# Patient Record
Sex: FEMALE | Race: WHITE | NOT HISPANIC OR LATINO | Employment: FULL TIME | URBAN - METROPOLITAN AREA
[De-identification: names, ages, dates, MRNs, and addresses within clinical notes are randomized per-mention and may not be internally consistent; named-entity substitution may affect disease eponyms.]

---

## 2024-01-08 ENCOUNTER — LAB (OUTPATIENT)
Dept: LAB | Facility: HOSPITAL | Age: 39
End: 2024-01-08
Payer: COMMERCIAL

## 2024-01-08 ENCOUNTER — TRANSCRIBE ORDERS (OUTPATIENT)
Dept: LAB | Facility: HOSPITAL | Age: 39
End: 2024-01-08
Payer: COMMERCIAL

## 2024-01-08 DIAGNOSIS — Z3A.24 24 WEEKS GESTATION OF PREGNANCY: ICD-10-CM

## 2024-01-08 DIAGNOSIS — Z3A.24 24 WEEKS GESTATION OF PREGNANCY: Primary | ICD-10-CM

## 2024-01-08 LAB
BLD GP AB SCN SERPL QL: NEGATIVE
DEPRECATED RDW RBC AUTO: 42.8 FL (ref 37–54)
ERYTHROCYTE [DISTWIDTH] IN BLOOD BY AUTOMATED COUNT: 12.9 % (ref 12.3–15.4)
GLUCOSE 1H P 100 G GLC PO SERPL-MCNC: 111 MG/DL (ref 65–139)
HCT VFR BLD AUTO: 35.5 % (ref 34–46.6)
HGB BLD-MCNC: 12.1 G/DL (ref 12–15.9)
MCH RBC QN AUTO: 31.5 PG (ref 26.6–33)
MCHC RBC AUTO-ENTMCNC: 34.1 G/DL (ref 31.5–35.7)
MCV RBC AUTO: 92.4 FL (ref 79–97)
PLATELET # BLD AUTO: 262 10*3/MM3 (ref 140–450)
PMV BLD AUTO: 9.7 FL (ref 6–12)
RBC # BLD AUTO: 3.84 10*6/MM3 (ref 3.77–5.28)
WBC NRBC COR # BLD AUTO: 10.09 10*3/MM3 (ref 3.4–10.8)

## 2024-01-08 PROCEDURE — 36415 COLL VENOUS BLD VENIPUNCTURE: CPT

## 2024-01-08 PROCEDURE — 82948 REAGENT STRIP/BLOOD GLUCOSE: CPT | Performed by: OBSTETRICS & GYNECOLOGY

## 2024-01-08 PROCEDURE — 86850 RBC ANTIBODY SCREEN: CPT

## 2024-01-08 PROCEDURE — 82950 GLUCOSE TEST: CPT

## 2024-01-08 PROCEDURE — 85027 COMPLETE CBC AUTOMATED: CPT | Performed by: OBSTETRICS & GYNECOLOGY

## 2024-02-09 ENCOUNTER — TRANSCRIBE ORDERS (OUTPATIENT)
Dept: LAB | Facility: HOSPITAL | Age: 39
End: 2024-02-09
Payer: COMMERCIAL

## 2024-02-09 ENCOUNTER — LAB (OUTPATIENT)
Dept: LAB | Facility: HOSPITAL | Age: 39
End: 2024-02-09
Payer: COMMERCIAL

## 2024-02-09 DIAGNOSIS — Z3A.29 29 WEEKS GESTATION OF PREGNANCY: Primary | ICD-10-CM

## 2024-02-09 DIAGNOSIS — Z3A.29 29 WEEKS GESTATION OF PREGNANCY: ICD-10-CM

## 2024-02-09 PROCEDURE — 36415 COLL VENOUS BLD VENIPUNCTURE: CPT

## 2024-02-09 PROCEDURE — 86780 TREPONEMA PALLIDUM: CPT

## 2024-02-12 LAB — TREPONEMA PALLIDUM IGG+IGM AB [PRESENCE] IN SERUM OR PLASMA BY IMMUNOASSAY: NON REACTIVE

## 2024-03-17 ENCOUNTER — HOSPITAL ENCOUNTER (INPATIENT)
Facility: HOSPITAL | Age: 39
LOS: 4 days | Discharge: HOME | End: 2024-03-21
Attending: OBSTETRICS & GYNECOLOGY | Admitting: OBSTETRICS & GYNECOLOGY
Payer: COMMERCIAL

## 2024-03-17 ENCOUNTER — ANESTHESIA EVENT (OUTPATIENT)
Dept: LABOR AND DELIVERY | Facility: HOSPITAL | Age: 39
End: 2024-03-17
Payer: COMMERCIAL

## 2024-03-17 ENCOUNTER — ANESTHESIA (OUTPATIENT)
Dept: LABOR AND DELIVERY | Facility: HOSPITAL | Age: 39
End: 2024-03-17
Payer: COMMERCIAL

## 2024-03-17 DIAGNOSIS — Z98.891 STATUS POST REPEAT LOW TRANSVERSE CESAREAN SECTION: Primary | ICD-10-CM

## 2024-03-17 LAB
ABO GROUP BLD: NORMAL
ABO GROUP BLD: NORMAL
BLD GP AB SCN SERPL QL: NEGATIVE
DEPRECATED RDW RBC AUTO: 41.1 FL (ref 37–54)
ERYTHROCYTE [DISTWIDTH] IN BLOOD BY AUTOMATED COUNT: 13.4 % (ref 12.3–15.4)
HCT VFR BLD AUTO: 35.1 % (ref 34–46.6)
HGB BLD-MCNC: 11.6 G/DL (ref 12–15.9)
MCH RBC QN AUTO: 28.4 PG (ref 26.6–33)
MCHC RBC AUTO-ENTMCNC: 33 G/DL (ref 31.5–35.7)
MCV RBC AUTO: 86 FL (ref 79–97)
PLATELET # BLD AUTO: 237 10*3/MM3 (ref 140–450)
PMV BLD AUTO: 9.7 FL (ref 6–12)
POC AMNISURE: POSITIVE
RBC # BLD AUTO: 4.08 10*6/MM3 (ref 3.77–5.28)
RH BLD: POSITIVE
RH BLD: POSITIVE
T PALLIDUM IGG SER QL: NORMAL
T&S EXPIRATION DATE: NORMAL
WBC NRBC COR # BLD AUTO: 10.08 10*3/MM3 (ref 3.4–10.8)

## 2024-03-17 PROCEDURE — 59025 FETAL NON-STRESS TEST: CPT

## 2024-03-17 PROCEDURE — 25010000002 KETOROLAC TROMETHAMINE PER 15 MG: Performed by: OBSTETRICS & GYNECOLOGY

## 2024-03-17 PROCEDURE — 86850 RBC ANTIBODY SCREEN: CPT | Performed by: OBSTETRICS & GYNECOLOGY

## 2024-03-17 PROCEDURE — 25010000002 OXYTOCIN PER 10 UNITS: Performed by: ANESTHESIOLOGY

## 2024-03-17 PROCEDURE — 86780 TREPONEMA PALLIDUM: CPT | Performed by: OBSTETRICS & GYNECOLOGY

## 2024-03-17 PROCEDURE — 86900 BLOOD TYPING SEROLOGIC ABO: CPT

## 2024-03-17 PROCEDURE — 86900 BLOOD TYPING SEROLOGIC ABO: CPT | Performed by: OBSTETRICS & GYNECOLOGY

## 2024-03-17 PROCEDURE — 84112 EVAL AMNIOTIC FLUID PROTEIN: CPT | Performed by: OBSTETRICS & GYNECOLOGY

## 2024-03-17 PROCEDURE — 59025 FETAL NON-STRESS TEST: CPT | Performed by: OBSTETRICS & GYNECOLOGY

## 2024-03-17 PROCEDURE — 25810000003 LACTATED RINGERS SOLUTION: Performed by: OBSTETRICS & GYNECOLOGY

## 2024-03-17 PROCEDURE — 25010000002 ONDANSETRON PER 1 MG: Performed by: ANESTHESIOLOGY

## 2024-03-17 PROCEDURE — 25010000002 CEFAZOLIN PER 500 MG: Performed by: OBSTETRICS & GYNECOLOGY

## 2024-03-17 PROCEDURE — 85027 COMPLETE CBC AUTOMATED: CPT | Performed by: OBSTETRICS & GYNECOLOGY

## 2024-03-17 PROCEDURE — 86901 BLOOD TYPING SEROLOGIC RH(D): CPT | Performed by: OBSTETRICS & GYNECOLOGY

## 2024-03-17 PROCEDURE — 25810000003 LACTATED RINGERS PER 1000 ML: Performed by: OBSTETRICS & GYNECOLOGY

## 2024-03-17 PROCEDURE — 88307 TISSUE EXAM BY PATHOLOGIST: CPT | Performed by: OBSTETRICS & GYNECOLOGY

## 2024-03-17 PROCEDURE — 88302 TISSUE EXAM BY PATHOLOGIST: CPT | Performed by: OBSTETRICS & GYNECOLOGY

## 2024-03-17 PROCEDURE — 25010000002 METOCLOPRAMIDE PER 10 MG: Performed by: ANESTHESIOLOGY

## 2024-03-17 PROCEDURE — S0260 H&P FOR SURGERY: HCPCS | Performed by: OBSTETRICS & GYNECOLOGY

## 2024-03-17 PROCEDURE — 25010000002 MIDAZOLAM PER 1 MG: Performed by: ANESTHESIOLOGY

## 2024-03-17 PROCEDURE — 25010000002 FENTANYL CITRATE (PF) 100 MCG/2ML SOLUTION: Performed by: ANESTHESIOLOGY

## 2024-03-17 PROCEDURE — 25010000002 BUPIVACAINE IN DEXTROSE 0.75-8.25 % SOLUTION: Performed by: ANESTHESIOLOGY

## 2024-03-17 PROCEDURE — 25010000002 MORPHINE PER 10 MG: Performed by: ANESTHESIOLOGY

## 2024-03-17 PROCEDURE — 86901 BLOOD TYPING SEROLOGIC RH(D): CPT

## 2024-03-17 PROCEDURE — 0UB70ZZ EXCISION OF BILATERAL FALLOPIAN TUBES, OPEN APPROACH: ICD-10-PCS | Performed by: OBSTETRICS & GYNECOLOGY

## 2024-03-17 DEVICE — SEAL HEMO SURG ARISTA/AH ABS/PWDR 3GM: Type: IMPLANTABLE DEVICE | Site: ABDOMEN | Status: FUNCTIONAL

## 2024-03-17 RX ORDER — KETOROLAC TROMETHAMINE 30 MG/ML
30 INJECTION, SOLUTION INTRAMUSCULAR; INTRAVENOUS ONCE
Status: COMPLETED | OUTPATIENT
Start: 2024-03-17 | End: 2024-03-17

## 2024-03-17 RX ORDER — DOCUSATE SODIUM 100 MG/1
100 CAPSULE, LIQUID FILLED ORAL 2 TIMES DAILY PRN
Status: DISCONTINUED | OUTPATIENT
Start: 2024-03-17 | End: 2024-03-21 | Stop reason: HOSPADM

## 2024-03-17 RX ORDER — CITRIC ACID/SODIUM CITRATE 334-500MG
30 SOLUTION, ORAL ORAL ONCE
Status: COMPLETED | OUTPATIENT
Start: 2024-03-17 | End: 2024-03-17

## 2024-03-17 RX ORDER — MORPHINE SULFATE 0.5 MG/ML
INJECTION, SOLUTION EPIDURAL; INTRATHECAL; INTRAVENOUS AS NEEDED
Status: DISCONTINUED | OUTPATIENT
Start: 2024-03-17 | End: 2024-03-17 | Stop reason: SURG

## 2024-03-17 RX ORDER — METHYLERGONOVINE MALEATE 0.2 MG/ML
200 INJECTION INTRAVENOUS ONCE AS NEEDED
Status: DISCONTINUED | OUTPATIENT
Start: 2024-03-17 | End: 2024-03-17 | Stop reason: HOSPADM

## 2024-03-17 RX ORDER — KETOROLAC TROMETHAMINE 15 MG/ML
15 INJECTION, SOLUTION INTRAMUSCULAR; INTRAVENOUS EVERY 6 HOURS
Status: COMPLETED | OUTPATIENT
Start: 2024-03-17 | End: 2024-03-18

## 2024-03-17 RX ORDER — SODIUM CHLORIDE 0.9 % (FLUSH) 0.9 %
10 SYRINGE (ML) INJECTION EVERY 12 HOURS SCHEDULED
Status: DISCONTINUED | OUTPATIENT
Start: 2024-03-17 | End: 2024-03-17 | Stop reason: HOSPADM

## 2024-03-17 RX ORDER — ALUMINA, MAGNESIA, AND SIMETHICONE 2400; 2400; 240 MG/30ML; MG/30ML; MG/30ML
15 SUSPENSION ORAL EVERY 4 HOURS PRN
Status: DISCONTINUED | OUTPATIENT
Start: 2024-03-17 | End: 2024-03-21 | Stop reason: HOSPADM

## 2024-03-17 RX ORDER — MISOPROSTOL 200 UG/1
600 TABLET ORAL AS NEEDED
Status: DISCONTINUED | OUTPATIENT
Start: 2024-03-17 | End: 2024-03-21 | Stop reason: HOSPADM

## 2024-03-17 RX ORDER — POLYETHYLENE GLYCOL 3350 17 G/17G
17 POWDER, FOR SOLUTION ORAL DAILY
Status: DISCONTINUED | OUTPATIENT
Start: 2024-03-17 | End: 2024-03-21 | Stop reason: HOSPADM

## 2024-03-17 RX ORDER — HYDROCORTISONE 25 MG/G
1 CREAM TOPICAL AS NEEDED
Status: DISCONTINUED | OUTPATIENT
Start: 2024-03-17 | End: 2024-03-21 | Stop reason: HOSPADM

## 2024-03-17 RX ORDER — OXYCODONE HYDROCHLORIDE 5 MG/1
5 TABLET ORAL EVERY 4 HOURS PRN
Status: DISCONTINUED | OUTPATIENT
Start: 2024-03-17 | End: 2024-03-21 | Stop reason: HOSPADM

## 2024-03-17 RX ORDER — DIPHENHYDRAMINE HCL 25 MG
25 CAPSULE ORAL EVERY 4 HOURS PRN
Status: DISCONTINUED | OUTPATIENT
Start: 2024-03-17 | End: 2024-03-17 | Stop reason: SDUPTHER

## 2024-03-17 RX ORDER — PANTOPRAZOLE SODIUM 40 MG/1
40 TABLET, DELAYED RELEASE ORAL
Status: DISCONTINUED | OUTPATIENT
Start: 2024-03-17 | End: 2024-03-21 | Stop reason: HOSPADM

## 2024-03-17 RX ORDER — OXYTOCIN/0.9 % SODIUM CHLORIDE 30/500 ML
125 PLASTIC BAG, INJECTION (ML) INTRAVENOUS ONCE AS NEEDED
Status: DISCONTINUED | OUTPATIENT
Start: 2024-03-17 | End: 2024-03-21 | Stop reason: HOSPADM

## 2024-03-17 RX ORDER — DIPHENHYDRAMINE HYDROCHLORIDE 50 MG/ML
25 INJECTION INTRAMUSCULAR; INTRAVENOUS EVERY 4 HOURS PRN
Status: DISCONTINUED | OUTPATIENT
Start: 2024-03-17 | End: 2024-03-17 | Stop reason: SDUPTHER

## 2024-03-17 RX ORDER — MIDAZOLAM HYDROCHLORIDE 1 MG/ML
INJECTION INTRAMUSCULAR; INTRAVENOUS AS NEEDED
Status: DISCONTINUED | OUTPATIENT
Start: 2024-03-17 | End: 2024-03-17 | Stop reason: SURG

## 2024-03-17 RX ORDER — NALOXONE HCL 0.4 MG/ML
0.4 VIAL (ML) INJECTION
Status: ACTIVE | OUTPATIENT
Start: 2024-03-17 | End: 2024-03-18

## 2024-03-17 RX ORDER — SODIUM CHLORIDE 0.9 % (FLUSH) 0.9 %
10 SYRINGE (ML) INJECTION AS NEEDED
Status: DISCONTINUED | OUTPATIENT
Start: 2024-03-17 | End: 2024-03-17 | Stop reason: HOSPADM

## 2024-03-17 RX ORDER — OXYTOCIN/0.9 % SODIUM CHLORIDE 30/500 ML
PLASTIC BAG, INJECTION (ML) INTRAVENOUS AS NEEDED
Status: DISCONTINUED | OUTPATIENT
Start: 2024-03-17 | End: 2024-03-17 | Stop reason: SURG

## 2024-03-17 RX ORDER — ONDANSETRON 2 MG/ML
4 INJECTION INTRAMUSCULAR; INTRAVENOUS EVERY 6 HOURS PRN
Status: DISCONTINUED | OUTPATIENT
Start: 2024-03-17 | End: 2024-03-21 | Stop reason: HOSPADM

## 2024-03-17 RX ORDER — BUPIVACAINE HYDROCHLORIDE 7.5 MG/ML
INJECTION, SOLUTION INTRASPINAL
Status: COMPLETED | OUTPATIENT
Start: 2024-03-17 | End: 2024-03-17

## 2024-03-17 RX ORDER — PRENATAL WITH FERROUS FUM AND FOLIC ACID 3080; 920; 120; 400; 22; 1.84; 3; 20; 10; 1; 12; 200; 27; 25; 2 [IU]/1; [IU]/1; MG/1; [IU]/1; MG/1; MG/1; MG/1; MG/1; MG/1; MG/1; UG/1; MG/1; MG/1; MG/1; MG/1
TABLET ORAL DAILY
COMMUNITY

## 2024-03-17 RX ORDER — CALCIUM CARBONATE 500 MG/1
1 TABLET, CHEWABLE ORAL EVERY 4 HOURS PRN
Status: DISCONTINUED | OUTPATIENT
Start: 2024-03-17 | End: 2024-03-21 | Stop reason: HOSPADM

## 2024-03-17 RX ORDER — FAMOTIDINE 10 MG/ML
INJECTION, SOLUTION INTRAVENOUS AS NEEDED
Status: DISCONTINUED | OUTPATIENT
Start: 2024-03-17 | End: 2024-03-17 | Stop reason: SURG

## 2024-03-17 RX ORDER — METOCLOPRAMIDE HYDROCHLORIDE 5 MG/ML
INJECTION INTRAMUSCULAR; INTRAVENOUS AS NEEDED
Status: DISCONTINUED | OUTPATIENT
Start: 2024-03-17 | End: 2024-03-17 | Stop reason: SURG

## 2024-03-17 RX ORDER — DIPHENHYDRAMINE HCL 25 MG
25 CAPSULE ORAL EVERY 4 HOURS PRN
Status: DISCONTINUED | OUTPATIENT
Start: 2024-03-17 | End: 2024-03-21 | Stop reason: HOSPADM

## 2024-03-17 RX ORDER — METHYLERGONOVINE MALEATE 0.2 MG/ML
200 INJECTION INTRAVENOUS AS NEEDED
Status: DISCONTINUED | OUTPATIENT
Start: 2024-03-17 | End: 2024-03-21 | Stop reason: HOSPADM

## 2024-03-17 RX ORDER — OXYTOCIN/0.9 % SODIUM CHLORIDE 30/500 ML
250 PLASTIC BAG, INJECTION (ML) INTRAVENOUS CONTINUOUS
Status: ACTIVE | OUTPATIENT
Start: 2024-03-17 | End: 2024-03-17

## 2024-03-17 RX ORDER — DIPHENHYDRAMINE HYDROCHLORIDE 50 MG/ML
25 INJECTION INTRAMUSCULAR; INTRAVENOUS ONCE AS NEEDED
Status: DISCONTINUED | OUTPATIENT
Start: 2024-03-17 | End: 2024-03-21 | Stop reason: HOSPADM

## 2024-03-17 RX ORDER — ONDANSETRON 2 MG/ML
4 INJECTION INTRAMUSCULAR; INTRAVENOUS ONCE AS NEEDED
Status: ACTIVE | OUTPATIENT
Start: 2024-03-17 | End: 2024-03-18

## 2024-03-17 RX ORDER — FENTANYL CITRATE 50 UG/ML
INJECTION, SOLUTION INTRAMUSCULAR; INTRAVENOUS AS NEEDED
Status: DISCONTINUED | OUTPATIENT
Start: 2024-03-17 | End: 2024-03-17 | Stop reason: SURG

## 2024-03-17 RX ORDER — ACETAMINOPHEN 500 MG
1000 TABLET ORAL EVERY 6 HOURS
Status: COMPLETED | OUTPATIENT
Start: 2024-03-17 | End: 2024-03-18

## 2024-03-17 RX ORDER — OXYCODONE HYDROCHLORIDE 10 MG/1
10 TABLET ORAL EVERY 4 HOURS PRN
Status: DISCONTINUED | OUTPATIENT
Start: 2024-03-17 | End: 2024-03-21 | Stop reason: HOSPADM

## 2024-03-17 RX ORDER — ACETAMINOPHEN 325 MG/1
650 TABLET ORAL EVERY 6 HOURS
Status: DISCONTINUED | OUTPATIENT
Start: 2024-03-18 | End: 2024-03-20

## 2024-03-17 RX ORDER — SODIUM CHLORIDE, SODIUM LACTATE, POTASSIUM CHLORIDE, CALCIUM CHLORIDE 600; 310; 30; 20 MG/100ML; MG/100ML; MG/100ML; MG/100ML
125 INJECTION, SOLUTION INTRAVENOUS CONTINUOUS
Status: DISCONTINUED | OUTPATIENT
Start: 2024-03-17 | End: 2024-03-17

## 2024-03-17 RX ORDER — SODIUM CHLORIDE 9 MG/ML
40 INJECTION, SOLUTION INTRAVENOUS AS NEEDED
Status: DISCONTINUED | OUTPATIENT
Start: 2024-03-17 | End: 2024-03-17 | Stop reason: HOSPADM

## 2024-03-17 RX ORDER — CARBOPROST TROMETHAMINE 250 UG/ML
250 INJECTION, SOLUTION INTRAMUSCULAR AS NEEDED
Status: DISCONTINUED | OUTPATIENT
Start: 2024-03-17 | End: 2024-03-17 | Stop reason: HOSPADM

## 2024-03-17 RX ORDER — ONDANSETRON 2 MG/ML
INJECTION INTRAMUSCULAR; INTRAVENOUS AS NEEDED
Status: DISCONTINUED | OUTPATIENT
Start: 2024-03-17 | End: 2024-03-17 | Stop reason: SURG

## 2024-03-17 RX ORDER — ACETAMINOPHEN 500 MG
1000 TABLET ORAL ONCE
Status: COMPLETED | OUTPATIENT
Start: 2024-03-17 | End: 2024-03-17

## 2024-03-17 RX ORDER — PRENATAL VIT/IRON FUM/FOLIC AC 27MG-0.8MG
1 TABLET ORAL DAILY
Status: DISCONTINUED | OUTPATIENT
Start: 2024-03-17 | End: 2024-03-21 | Stop reason: HOSPADM

## 2024-03-17 RX ORDER — OXYTOCIN/0.9 % SODIUM CHLORIDE 30/500 ML
999 PLASTIC BAG, INJECTION (ML) INTRAVENOUS ONCE
Status: DISCONTINUED | OUTPATIENT
Start: 2024-03-17 | End: 2024-03-17 | Stop reason: HOSPADM

## 2024-03-17 RX ORDER — ONDANSETRON 4 MG/1
4 TABLET, ORALLY DISINTEGRATING ORAL EVERY 6 HOURS PRN
Status: DISCONTINUED | OUTPATIENT
Start: 2024-03-17 | End: 2024-03-21 | Stop reason: HOSPADM

## 2024-03-17 RX ORDER — CARBOPROST TROMETHAMINE 250 UG/ML
250 INJECTION, SOLUTION INTRAMUSCULAR AS NEEDED
Status: DISCONTINUED | OUTPATIENT
Start: 2024-03-17 | End: 2024-03-21 | Stop reason: HOSPADM

## 2024-03-17 RX ORDER — OXYTOCIN 10 [USP'U]/ML
INJECTION, SOLUTION INTRAMUSCULAR; INTRAVENOUS AS NEEDED
Status: DISCONTINUED | OUTPATIENT
Start: 2024-03-17 | End: 2024-03-17 | Stop reason: SURG

## 2024-03-17 RX ORDER — MISOPROSTOL 200 UG/1
800 TABLET ORAL AS NEEDED
Status: DISCONTINUED | OUTPATIENT
Start: 2024-03-17 | End: 2024-03-17 | Stop reason: HOSPADM

## 2024-03-17 RX ORDER — LIDOCAINE HYDROCHLORIDE 10 MG/ML
0.5 INJECTION, SOLUTION EPIDURAL; INFILTRATION; INTRACAUDAL; PERINEURAL ONCE AS NEEDED
Status: DISCONTINUED | OUTPATIENT
Start: 2024-03-17 | End: 2024-03-17 | Stop reason: HOSPADM

## 2024-03-17 RX ORDER — IBUPROFEN 600 MG/1
600 TABLET ORAL EVERY 6 HOURS
Status: DISCONTINUED | OUTPATIENT
Start: 2024-03-18 | End: 2024-03-21 | Stop reason: HOSPADM

## 2024-03-17 RX ORDER — OMEPRAZOLE 20 MG/1
20 CAPSULE, DELAYED RELEASE ORAL DAILY
COMMUNITY

## 2024-03-17 RX ORDER — ALUMINA, MAGNESIA, AND SIMETHICONE 2400; 2400; 240 MG/30ML; MG/30ML; MG/30ML
15 SUSPENSION ORAL EVERY 4 HOURS PRN
Status: DISCONTINUED | OUTPATIENT
Start: 2024-03-17 | End: 2024-03-17 | Stop reason: SDUPTHER

## 2024-03-17 RX ADMIN — Medication 1000 ML: at 05:39

## 2024-03-17 RX ADMIN — SODIUM CITRATE AND CITRIC ACID MONOHYDRATE 30 ML: 500; 334 SOLUTION ORAL at 04:48

## 2024-03-17 RX ADMIN — SODIUM CHLORIDE, POTASSIUM CHLORIDE, SODIUM LACTATE AND CALCIUM CHLORIDE 1000 ML: 600; 310; 30; 20 INJECTION, SOLUTION INTRAVENOUS at 04:20

## 2024-03-17 RX ADMIN — SODIUM CHLORIDE, POTASSIUM CHLORIDE, SODIUM LACTATE AND CALCIUM CHLORIDE: 600; 310; 30; 20 INJECTION, SOLUTION INTRAVENOUS at 06:00

## 2024-03-17 RX ADMIN — KETOROLAC TROMETHAMINE 15 MG: 15 INJECTION, SOLUTION INTRAMUSCULAR; INTRAVENOUS at 13:31

## 2024-03-17 RX ADMIN — KETOROLAC TROMETHAMINE 15 MG: 15 INJECTION, SOLUTION INTRAMUSCULAR; INTRAVENOUS at 18:33

## 2024-03-17 RX ADMIN — MIDAZOLAM HYDROCHLORIDE 1 MG: 1 INJECTION, SOLUTION INTRAMUSCULAR; INTRAVENOUS at 05:43

## 2024-03-17 RX ADMIN — MIDAZOLAM HYDROCHLORIDE 1 MG: 1 INJECTION, SOLUTION INTRAMUSCULAR; INTRAVENOUS at 05:52

## 2024-03-17 RX ADMIN — MORPHINE SULFATE 0.15 MG: 0.5 INJECTION, SOLUTION EPIDURAL; INTRATHECAL; INTRAVENOUS at 05:21

## 2024-03-17 RX ADMIN — OXYTOCIN 3 UNITS: 10 INJECTION INTRAVENOUS at 05:39

## 2024-03-17 RX ADMIN — MIDAZOLAM HYDROCHLORIDE 1 MG: 1 INJECTION, SOLUTION INTRAMUSCULAR; INTRAVENOUS at 05:14

## 2024-03-17 RX ADMIN — MIDAZOLAM HYDROCHLORIDE 2 MG: 1 INJECTION, SOLUTION INTRAMUSCULAR; INTRAVENOUS at 05:39

## 2024-03-17 RX ADMIN — SODIUM CHLORIDE 2000 MG: 900 INJECTION INTRAVENOUS at 04:48

## 2024-03-17 RX ADMIN — ACETAMINOPHEN 1000 MG: 500 TABLET ORAL at 04:32

## 2024-03-17 RX ADMIN — SODIUM CHLORIDE, POTASSIUM CHLORIDE, SODIUM LACTATE AND CALCIUM CHLORIDE: 600; 310; 30; 20 INJECTION, SOLUTION INTRAVENOUS at 06:08

## 2024-03-17 RX ADMIN — OXYCODONE 5 MG: 5 TABLET ORAL at 07:55

## 2024-03-17 RX ADMIN — ONDANSETRON 4 MG: 2 INJECTION INTRAMUSCULAR; INTRAVENOUS at 05:14

## 2024-03-17 RX ADMIN — KETOROLAC TROMETHAMINE 30 MG: 30 INJECTION, SOLUTION INTRAMUSCULAR; INTRAVENOUS at 06:41

## 2024-03-17 RX ADMIN — METOCLOPRAMIDE 10 MG: 5 INJECTION, SOLUTION INTRAMUSCULAR; INTRAVENOUS at 05:14

## 2024-03-17 RX ADMIN — SODIUM CHLORIDE, POTASSIUM CHLORIDE, SODIUM LACTATE AND CALCIUM CHLORIDE: 600; 310; 30; 20 INJECTION, SOLUTION INTRAVENOUS at 05:15

## 2024-03-17 RX ADMIN — OXYTOCIN 4 UNITS: 10 INJECTION INTRAVENOUS at 05:52

## 2024-03-17 RX ADMIN — PANTOPRAZOLE SODIUM 40 MG: 40 TABLET, DELAYED RELEASE ORAL at 10:52

## 2024-03-17 RX ADMIN — ACETAMINOPHEN 1000 MG: 500 TABLET ORAL at 21:17

## 2024-03-17 RX ADMIN — FAMOTIDINE 20 MG: 10 INJECTION, SOLUTION INTRAVENOUS at 05:14

## 2024-03-17 RX ADMIN — MIDAZOLAM HYDROCHLORIDE 1 MG: 1 INJECTION, SOLUTION INTRAMUSCULAR; INTRAVENOUS at 05:57

## 2024-03-17 RX ADMIN — SODIUM CHLORIDE, POTASSIUM CHLORIDE, SODIUM LACTATE AND CALCIUM CHLORIDE 999 ML/HR: 600; 310; 30; 20 INJECTION, SOLUTION INTRAVENOUS at 04:48

## 2024-03-17 RX ADMIN — FENTANYL CITRATE 15 MCG: 50 INJECTION, SOLUTION INTRAMUSCULAR; INTRAVENOUS at 05:21

## 2024-03-17 RX ADMIN — ACETAMINOPHEN 1000 MG: 500 TABLET ORAL at 10:52

## 2024-03-17 RX ADMIN — DOCUSATE SODIUM 100 MG: 100 CAPSULE, LIQUID FILLED ORAL at 21:17

## 2024-03-17 RX ADMIN — ACETAMINOPHEN 1000 MG: 500 TABLET ORAL at 15:41

## 2024-03-17 RX ADMIN — OXYTOCIN 3 UNITS: 10 INJECTION INTRAVENOUS at 05:42

## 2024-03-17 RX ADMIN — BUPIVACAINE HYDROCHLORIDE IN DEXTROSE 1.3 ML: 7.5 INJECTION, SOLUTION SUBARACHNOID at 05:21

## 2024-03-17 RX ADMIN — MIDAZOLAM HYDROCHLORIDE 1 MG: 1 INJECTION, SOLUTION INTRAMUSCULAR; INTRAVENOUS at 05:42

## 2024-03-17 NOTE — ANESTHESIA POSTPROCEDURE EVALUATION
Patient: Jaja Sutton    Procedure Summary       Date: 24 Room / Location: [facility] LABOR DELIVERY 2   [facility] LABOR DELIVERY    Anesthesia Start: 514 Anesthesia Stop:     Procedure:  SECTION REPEAT WITH SALPINGECTOMY (Bilateral) Diagnosis:     Surgeons: Erin Quintanilla MD Provider: Rosibel Barraza DO    Anesthesia Type: ITN, spinal ASA Status: 2 - Emergent            Anesthesia Type: ITN, spinal    Vitals  Vitals Value Taken Time   /72 24 0629   Temp 97.8 °F (36.6 °C) 24 0619   Pulse 82 24 0633   Resp 16 24 0619   SpO2 97 % 24   Vitals shown include unfiled device data.        Post Anesthesia Care and Evaluation    Patient location during evaluation: bedside  Patient participation: complete - patient participated  Level of consciousness: awake and awake and alert  Pain score: 0  Pain management: satisfactory to patient    Airway patency: patent  Anesthetic complications: No anesthetic complications  PONV Status: none  Cardiovascular status: acceptable, hemodynamically stable and stable  Respiratory status: acceptable  Hydration status: stable  Post Neuraxial Block status: No signs or symptoms of PDPH

## 2024-03-17 NOTE — LACTATION NOTE
03/17/24 1114   Maternal Information   Date of Referral 03/17/24   Person Making Referral lactation consultant   Maternal Reason for Referral   (PCN was told by patient she is not interested in using a breast pump.)

## 2024-03-17 NOTE — H&P
Jaja Sutton  1985  [id number]  [id number]    CC: leaking and contractions  HPI:  Patient is 38 y.o. female   currently at 34w5d presents with c/o SROM at~0150 with nearly immed onset contractions.  Pt denies vag bleeding.  Good FM.  PNC comp by prev  and adv mat age.     PMH:  Current meds: PNV, prilosec  Illnesses: none  Surgeries: , D and C X 2, oral surg  Allergies: NKDA    Past OB History:       OB History    Para Term  AB Living   4 1 1 0 2 1   SAB IAB Ectopic Molar Multiple Live Births   2 0 0 0 0 0      # Outcome Date GA Lbr [place]/2nd Weight Sex Type Anes PTL Lv   4 Current            3 Term            2 SAB            1 SAB               Obstetric Comments   SAB 2009   SAB 2012   10/02/2014  41 weeks male 8-11 oz c/s            SH: tob neg , EtOH newg, drugs neg    General ROS: contractions, leaking, edema.   All other systems reviewed and are negative.      Physical Examination: General appearance - alert, well appearing, and in no distress  Vital signs - There were no vitals taken for this visit.  HEENT: normocephalic, atraumatic,oropharynx clear, appearance of ears and nose normal  Neck - supple, no significant adenopathy, no thyromegaly  Lymphatics - no palpable lymphadenopathy in the neck or groin, no hepatosplenomegaly  Chest - clear to auscultation, no wheezes, rales or rhonchi, respiratory effort non-labored  Heart - normal rate, regular rhythm, no murmurs, rubs, clicks or gallops, no JVD, trace lower extremity edema  Abdomen - soft, nontender, nondistended, no masses, no hepatosplenomegaly  no rebound tenderness noted, bowel sounds normal  Vaginal Exam: 2/80%/-2, grossly ruptured, amnisure pos ,external genitalia normal  Extremities - trace pedal edema noted, no calf tend  Skin -warm and dry, normal coloration and turgor, no rashes, no suspicious skin lesions noted      Fetal monitoring: indication contractions , onset 0314 , offset 0350 , baseline 135 , mod  "BTB variability , multiple accels (15 X 15), no decels, q2-4\" contractions, interpretation reactive NST    Radiology     Assessment 1)IUP 34 5/7 weeks   2)PPROM with onset labor    3)prev    4)desires steril- after discussion of types and risks and benefits of each,    Pt desires bilat salpingectomy    Plan 1)admit   2)repeat  with bilat salpingectomy    Caesar Armenta MD  3/17/2024  03:50 EDT                                                                     "

## 2024-03-17 NOTE — OP NOTE
Section With Bilateral Salpingectomy  Procedure Note    Jaja Sutton    3/17/2024         Pre-operative Diagnosis:   1. IUP at 34w5d     2. PPROM     3. Previous CS, desires RCS       4. Desires permanent sterilization  5.  Advanced maternal age    Post-operative Diagnosis: same    Findings: Normal maternal anatomy    Estimated Blood Loss: 500.  QBL pending           Drains: Mancia                 Specimens: bilateral fallopian tubes              Complications:  None; patient tolerated the procedure well.           Disposition: PACU - hemodynamically stable.           Condition: stable    Surgeon: Erin Quintanilla MD     Assistants: Tech    Anesthesia: Spinal anesthesia    ASA Class: 2    Procedure Details   The patient was seen in the Holding Room. The risks, benefits, complications, treatment options, and expected outcomes were discussed with the patient.  The patient concurred with the proposed plan, giving informed consent.  The site of surgery was properly noted. The patient was taken to the Operating Room, identified as Jaja Sutton and the procedure verified as  Delivery. A Time Out was held and the above information confirmed.    After induction of anesthesia, the patient was draped and prepped in the usual sterile manner. A Pfannenstiel incision was made and carried down through the subcutaneous tissue to the fascia. A fascial incision was made and extended transversely. The fascia was  from the underlying rectus tissue superiorly and inferiorly. The peritoneum was identified and entered. The peritoneal incision was extended longitudinally.  A low transverse uterine incision was made. Delivered from the vertex presentation was a male infant with birth weight 2240 g (4 lb 15 oz)  with apgars scores of         APGARS  One minute Five minutes Ten minutes Fifteen minutes Twenty minutes   Skin color: 0   1             Heart rate: 1   2             Grimace: 2   2              Muscle tone: 1    1              Breathin   1              Totals: 5   7              . After the umbilical cord was clamped and cut, cord blood was obtained for evaluation. The placenta was removed intact and appeared normal. The uterine outline, tubes and ovaries appeared normal. The uterine incision was closed with running locked sutures of 1 chromic.  There was bleeding from the right uterine artery.  This was ligated with an [place] suture of 1 chromic superiorly and inferiorly to the hysterotomy.  Hemostasis was observed.  Shanon powder was placed to assure continued excellent hemostasis      The right fallopian tube was elevated with a Trent clamp.  The Enseal was used to sequentially coagulate and cut the parametrium from the fimbria to the cornua, where the tube was then transected and handed off to pathology.  The same procedure was repeated on the left fallopian tube which was removed and sent to pathology.  Hemostasis along the surgical planes was noted.  The fascia was then reapproximated with running sutures of 0 PDS. The subcutaneous tissue was reapproximated with 3-0 plain. The skin was reapproximated with insorb subcuticular staples and reinforced with skin glue.      Instrument, sponge, and needle counts were correct prior the abdominal closure and at the conclusion of the case.         Erin Quintanilla MD  10:19 EDT  3/17/2024

## 2024-03-17 NOTE — ANESTHESIA PROCEDURE NOTES
Spinal Block      Patient reassessed immediately prior to procedure    Patient location during procedure: OB  Indication:procedure for pain  Performed By  Anesthesiologist: Rosibel Barraza DO  Preanesthetic Checklist  Completed: patient identified, IV checked, risks and benefits discussed, surgical consent, monitors and equipment checked, pre-op evaluation and timeout performed  Spinal Block Prep:  Patient Position:sitting  Sterile Tech:cap, gloves, mask and sterile barriers  Prep:Chloraprep  Patient Monitoring:blood pressure monitoring and EKG    Spinal Block Procedure  Approach:midline  Guidance:palpation technique  Location:L3-L4  Needle Type:Monica  Needle Gauge:25 G  Placement of Spinal needle event:cerebrospinal fluid aspirated  Paresthesia: no  Fluid Appearance:clear  Medications: bupivacaine in dextrose (MARCAINE SPINAL / Hyberbaric) 0.75-8.25 % injection - Intrathecal   1.3 mL - 3/17/2024 5:21:00 AM   Post Assessment  Patient Tolerance:patient tolerated the procedure well with no apparent complications  Complications no

## 2024-03-17 NOTE — ANESTHESIA PREPROCEDURE EVALUATION
Anesthesia Evaluation     Patient summary reviewed and Nursing notes reviewed   NPO Solid Status: Waived due to emergency  NPO Liquid Status: Waived due to emergency           Airway   Mallampati: II  TM distance: >3 FB  Neck ROM: full  No difficulty expected  Dental - normal exam     Pulmonary    (+) a smoker Former, cigarettes,  Cardiovascular - negative cardio ROS        Neuro/Psych- negative ROS  (+) psychiatric history Anxiety  GI/Hepatic/Renal/Endo - negative ROS     Musculoskeletal (-) negative ROS    Abdominal    Substance History - negative use     OB/GYN    (+) Pregnant (SROM, laboring)        Other - negative ROS                   Anesthesia Plan    ASA 2 - emergent     ITN and spinal       Anesthetic plan, risks, benefits, and alternatives have been provided, discussed and informed consent has been obtained with: patient and spouse/significant other.  Pre-procedure education provided    CODE STATUS:    Level Of Support Discussed With: Patient  Code Status (Patient has no pulse and is not breathing): CPR (Attempt to Resuscitate)  Medical Interventions (Patient has pulse or is breathing): Full Support

## 2024-03-18 LAB
BASOPHILS # BLD AUTO: 0.04 10*3/MM3 (ref 0–0.2)
BASOPHILS NFR BLD AUTO: 0.4 % (ref 0–1.5)
DEPRECATED RDW RBC AUTO: 42.7 FL (ref 37–54)
EOSINOPHIL # BLD AUTO: 0.15 10*3/MM3 (ref 0–0.4)
EOSINOPHIL NFR BLD AUTO: 1.3 % (ref 0.3–6.2)
ERYTHROCYTE [DISTWIDTH] IN BLOOD BY AUTOMATED COUNT: 13.8 % (ref 12.3–15.4)
HCT VFR BLD AUTO: 27.6 % (ref 34–46.6)
HGB BLD-MCNC: 9.2 G/DL (ref 12–15.9)
IMM GRANULOCYTES # BLD AUTO: 0.05 10*3/MM3 (ref 0–0.05)
IMM GRANULOCYTES NFR BLD AUTO: 0.4 % (ref 0–0.5)
LYMPHOCYTES # BLD AUTO: 1.65 10*3/MM3 (ref 0.7–3.1)
LYMPHOCYTES NFR BLD AUTO: 14.8 % (ref 19.6–45.3)
MCH RBC QN AUTO: 28.9 PG (ref 26.6–33)
MCHC RBC AUTO-ENTMCNC: 33.3 G/DL (ref 31.5–35.7)
MCV RBC AUTO: 86.8 FL (ref 79–97)
MONOCYTES # BLD AUTO: 0.71 10*3/MM3 (ref 0.1–0.9)
MONOCYTES NFR BLD AUTO: 6.4 % (ref 5–12)
NEUTROPHILS NFR BLD AUTO: 76.7 % (ref 42.7–76)
NEUTROPHILS NFR BLD AUTO: 8.53 10*3/MM3 (ref 1.7–7)
NRBC BLD AUTO-RTO: 0 /100 WBC (ref 0–0.2)
PLATELET # BLD AUTO: 183 10*3/MM3 (ref 140–450)
PMV BLD AUTO: 9.6 FL (ref 6–12)
RBC # BLD AUTO: 3.18 10*6/MM3 (ref 3.77–5.28)
WBC NRBC COR # BLD AUTO: 11.13 10*3/MM3 (ref 3.4–10.8)

## 2024-03-18 PROCEDURE — 85025 COMPLETE CBC W/AUTO DIFF WBC: CPT | Performed by: OBSTETRICS & GYNECOLOGY

## 2024-03-18 PROCEDURE — 25010000002 KETOROLAC TROMETHAMINE PER 15 MG: Performed by: OBSTETRICS & GYNECOLOGY

## 2024-03-18 RX ORDER — FERROUS SULFATE 325(65) MG
325 TABLET ORAL
Status: DISCONTINUED | OUTPATIENT
Start: 2024-03-18 | End: 2024-03-19

## 2024-03-18 RX ADMIN — ACETAMINOPHEN 650 MG: 325 TABLET ORAL at 10:38

## 2024-03-18 RX ADMIN — IBUPROFEN 600 MG: 600 TABLET, FILM COATED ORAL at 13:16

## 2024-03-18 RX ADMIN — POLYETHYLENE GLYCOL 3350 17 G: 17 POWDER, FOR SOLUTION ORAL at 10:38

## 2024-03-18 RX ADMIN — IBUPROFEN 600 MG: 600 TABLET, FILM COATED ORAL at 18:53

## 2024-03-18 RX ADMIN — ACETAMINOPHEN 1000 MG: 500 TABLET ORAL at 03:48

## 2024-03-18 RX ADMIN — PRENATAL VITAMINS-IRON FUMARATE 27 MG IRON-FOLIC ACID 0.8 MG TABLET 1 TABLET: at 10:38

## 2024-03-18 RX ADMIN — CALCIUM CARBONATE (ANTACID) CHEW TAB 500 MG 1 TABLET: 500 CHEW TAB at 13:20

## 2024-03-18 RX ADMIN — ALUMINUM HYDROXIDE, MAGNESIUM HYDROXIDE, AND DIMETHICONE 15 ML: 400; 400; 40 SUSPENSION ORAL at 17:36

## 2024-03-18 RX ADMIN — PANTOPRAZOLE SODIUM 40 MG: 40 TABLET, DELAYED RELEASE ORAL at 06:42

## 2024-03-18 RX ADMIN — KETOROLAC TROMETHAMINE 15 MG: 15 INJECTION, SOLUTION INTRAMUSCULAR; INTRAVENOUS at 06:42

## 2024-03-18 RX ADMIN — DOCUSATE SODIUM 100 MG: 100 CAPSULE, LIQUID FILLED ORAL at 22:44

## 2024-03-18 RX ADMIN — ACETAMINOPHEN 650 MG: 325 TABLET ORAL at 22:44

## 2024-03-18 RX ADMIN — FERROUS SULFATE TAB 325 MG (65 MG ELEMENTAL FE) 325 MG: 325 (65 FE) TAB at 10:38

## 2024-03-18 RX ADMIN — ACETAMINOPHEN 650 MG: 325 TABLET ORAL at 15:29

## 2024-03-18 RX ADMIN — KETOROLAC TROMETHAMINE 15 MG: 15 INJECTION, SOLUTION INTRAMUSCULAR; INTRAVENOUS at 00:32

## 2024-03-18 NOTE — PROGRESS NOTES
3/18/2024    Name:Jaja Sutton    MR#:[id number]     PROGRESS NOTE:  Post-Op 1 S/P        Subjective   38 y.o. yo Female  s/p CS at 34w5d doing well. Pain well controlled, lochia appropriate       premature rupture of membranes (PPROM) with onset of labor within 24 hours of rupture in third trimester, antepartum    Status post repeat low transverse  section        Objective    Vitals  Temp:  Temp:  [97.5 °F (36.4 °C)-98.5 °F (36.9 °C)] 98 °F (36.7 °C)  Temp src: Oral  BP:  BP: (108-129)/(64-76) 117/76  Pulse:  Heart Rate:  [75-91] 89  RR:   Resp:  [16-18] 16    General Awake, alert, no distress  Abdomen Soft, nondistended, fundus firm, below umbilicus, appropriately tender  Incision  Intact, no erythema or exudate  Extremities Calves NT bilaterally     I/O last 3 completed shifts:  In: 1500 [I.V.:1500]  Out: 2986 [Urine:; Blood:936]    Lab Results   Component Value Date    WBC 11.13 (H) 2024    HGB 9.2 (L) 2024    HCT 27.6 (L) 2024    MCV 86.8 2024     2024     Results from last 7 days   Lab Units 24  0446 24  0414   ABO TYPING  A A   RH TYPING  Positive Positive   ANTIBODY SCREEN   --  Negative       Infant: male       Assessment   1.  POD 1 from  Section  2. Anemia 2/2 acute blood loss    Plan: Doing well.    Start po iron  Discontinue IV, advance diet, may shower.          Roseline Lezama CNM  3/18/2024 09:14 EDT

## 2024-03-18 NOTE — ANESTHESIA POSTPROCEDURE EVALUATION
Patient: Jaja Sutton    Procedure Summary       Date: 24 Room / Location: [facility] LABOR DELIVERY 2 /  [facility] LABOR DELIVERY    Anesthesia Start: 514 Anesthesia Stop: 614    Procedure:  SECTION REPEAT WITH SALPINGECTOMY (Bilateral) Diagnosis:     Surgeons: Erin Quintanilla MD Provider: Rosibel Barraza DO    Anesthesia Type: ITN, spinal ASA Status: 2 - Emergent            Anesthesia Type: ITN, spinal    Vitals  Vitals Value Taken Time   /74 24 1232   Temp 97.6 °F (36.4 °C) 24 1232   Pulse 93 24 1232   Resp 18 24 1232   SpO2 98 % 24 0820           Post Anesthesia Care and Evaluation    Patient location during evaluation: bedside  Patient participation: complete - patient participated  Level of consciousness: awake and alert  Pain management: adequate    Airway patency: patent  Anesthetic complications: No anesthetic complications    Cardiovascular status: acceptable  Respiratory status: acceptable  Hydration status: acceptable  Post Neuraxial Block status: Motor and sensory function returned to baseline and No signs or symptoms of PDPH

## 2024-03-18 NOTE — PAYOR COMM NOTE
Extended office visit    Patient:  Ariel Hunter  YOB: 1982  Date of Visit:  22      Chief Complaint   Patient presents with   • Office Visit     Echo resulted 22   • Hospital F/U     1 week post hospital, c/o getting tired easily       HPI:   Post hospital discharge.  Admitted in late July with a pulmonary embolism and infarction.  No evidence of right heart strain.  Presented with pleuritic chest pain.  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  A transthoracic echocardiogram was limited and was not diagnostic of a spade like myocardium.  No cardiovascular events.  No chest pain, exercises regularly.  Participated in sports.  No history of syncopal events.  Accompanied today by the fiancé.  Has been feeling well.  Now on Eliquis.  Will follow-up with hematology.  They have been taking walks, has no shortness of breath.  Social history: Lives with a girlfriend/fiancé  Smoking history: none. No ETOH.   Past surgical history: None  ECG: In the hospital, sinus rhythm with T wave inversion precordially.  Cardiovascular risk factors: None   Father 75 yo. Liver and kidney transplant .   Sister 53 yo . At 39 yo  Liver transplant ? PBC.   Mom 73yo   2 sister . Another sister  of gall bladder ca.   Exercise: yes    Past Medical History:   Diagnosis Date   • COVID-19 virus detected 2020    Sx on 11/10/20 - HA, body aches, fatigue, cough.   • PND (post-nasal drip) 2019   • Varicella        Prior CATH/CABG : None  Vascular studies: None  Ischemic evaluation: None  TTE: 2022     1. Left ventricle: The cavity size is normal. Wall thickness is normal.     The ejection fraction was measured by biplane method of disks. The     ejection fraction is 77%.  2. Aortic valve: Transvalvular velocity is within the normal range. There     is no stenosis. No significant  Rachelchacho Jaja (38 y.o. Female)        Problem List             Codes Noted - Resolved       Hospital    * (Principal)  premature rupture of membranes (PPROM) with onset of labor within 24 hours of rupture in third trimester, antepartum ICD-10-CM: O42.013  ICD-9-CM: 658.13 3/17/2024 - Present    Status post repeat low transverse  section ICD-10-CM: Z98.891  ICD-9-CM: V45.89 3/17/2024 - Present        History & Physical        High, Caesar DELCID MD at 24 0349          Jaja Sutton  1985  [id number]  [id number]    CC: leaking and contractions  HPI:  Patient is 38 y.o. female   currently at 34w5d presents with c/o SROM at~0150 with nearly immed onset contractions.  Pt denies vag bleeding.  Good FM.  PNC comp by prev  and adv mat age.     PMH:  Current meds: PNV, prilosec  Illnesses: none  Surgeries: , D and C X 2, oral surg  Allergies: NKDA    Past OB History:       OB History    Para Term  AB Living   4 1 1 0 2 1   SAB IAB Ectopic Molar Multiple Live Births   2 0 0 0 0 0      # Outcome Date GA Lbr [place]/2nd Weight Sex Type Anes PTL Lv   4 Current            3 Term            2 SAB            1 SAB               Obstetric Comments   SAB 2009   SAB 2012   10/02/2014  41 weeks male 8-11 oz c/s            SH: tob neg , EtOH newg, drugs neg    General ROS: contractions, leaking, edema.   All other systems reviewed and are negative.      Physical Examination: General appearance - alert, well appearing, and in no distress  Vital signs - There were no vitals taken for this visit.  HEENT: normocephalic, atraumatic,oropharynx clear, appearance of ears and nose normal  Neck - supple, no significant adenopathy, no thyromegaly  Lymphatics - no palpable lymphadenopathy in the neck or groin, no hepatosplenomegaly  Chest - clear to auscultation, no wheezes, rales or rhonchi, respiratory effort non-labored  Heart - normal rate, regular rhythm, no murmurs, rubs, clicks or gallops, no  "JVD, trace lower extremity edema  Abdomen - soft, nontender, nondistended, no masses, no hepatosplenomegaly  no rebound tenderness noted, bowel sounds normal  Vaginal Exam: 2/80%/-2, grossly ruptured, amnisure pos ,external genitalia normal  Extremities - trace pedal edema noted, no calf tend  Skin -warm and dry, normal coloration and turgor, no rashes, no suspicious skin lesions noted      Fetal monitoring: indication contractions , onset 0314 , offset 0350 , baseline 135 , mod BTB variability , multiple accels (15 X 15), no decels, q2-4\" contractions, interpretation reactive NST    Radiology     Assessment 1)IUP 34 5/7 weeks   2)PPROM with onset labor    3)prev    4)desires steril- after discussion of types and risks and benefits of each,    Pt desires bilat salpingectomy    Plan 1)admit   2)repeat  with bilat salpingectomy    Caesar Armenta MD  3/17/2024  03:50 EDT                                                                       Electronically signed by Caesar Armenta MD at 24 0354       H&P signed by [employer] On[employer], [place] at 24 0714         [Media Unavailable] Scan on 3/17/2024 0713 by [employer], [place]: Prenatal Record          Electronically signed by [employer], [place] at 24 0714       Facility-Administered Medications as of 3/17/2024   Medication Dose Route Frequency Provider Last Rate Last Admin    [COMPLETED] acetaminophen (TYLENOL) tablet 1,000 mg  1,000 mg Oral Once Caesar Armenta MD   1,000 mg at 24 0432    acetaminophen (TYLENOL) tablet 1,000 mg  1,000 mg Oral Q6H Erin Quintanilla MD   1,000 mg at 24 1541    Followed by    [START ON 3/18/2024] acetaminophen (TYLENOL) tablet 650 mg  650 mg Oral Q6H Erin Quintanilla MD        aluminum-magnesium hydroxide-simethicone (MAALOX MAX) 400-400-40 MG/5ML suspension 15 mL  15 mL Oral Q4H PRN Erin Quintanilla MD        Or    calcium carbonate (TUMS) chewable tablet 500 mg (200 mg elemental)  1 tablet Oral " regurgitation.  3. Mitral valve: Transvalvular velocity is within the normal range. There     is no evidence for stenosis. No significant regurgitation.  4. Right ventricle: The cavity size is at the upper limits of normal. Wall     thickness is normal. Systolic function is normal.  5. Tricuspid valve: Trivial regurgitation.  6. Pulmonic valve: No significant regurgitation.     Past Surgical History:   Procedure Laterality Date   • Facial reconstruction surgery  2006    upper jaw moved back to correct overbite       Family History   Problem Relation Age of Onset   • Liver Disease Father         s/p liver transplant x 2   • Thyroid Father    • Liver Disease Sister         primary sclerosing cholangitis; s/p liver transplant   • Thyroid Sister    • Cancer Sister 50        gallbladder   • Patient is unaware of any medical problems Mother        Social History     Tobacco Use   • Smoking status: Never Smoker   • Smokeless tobacco: Never Used   Vaping Use   • Vaping Use: never used   Substance Use Topics   • Alcohol use: Yes     Comment: occ   • Drug use: Never       ALLERGIES:   Allergen Reactions   • Seasonal PRURITUS       Current Outpatient Medications   Medication Sig   • Acetaminophen (TYLENOL 8 HOUR PO) Take by mouth as needed.   • traMADol (ULTRAM) 50 MG tablet Take 1 tablet by mouth every 8 hours as needed for Pain.   • apixaBAN (ELIQUIS) 5 MG Tab Take 1 tablet by mouth every 12 hours. Take after finishing 10 mg Bid for 7 days   • apixaBAN (ELIQUIS) 5 MG Tab Take 2 tablets by mouth every 12 hours for 7 days. After 7 days take 5 mg oral BID     No current facility-administered medications for this visit.        Review of Systems   Constitutional: Negative for activity change, appetite change, fever and unexpected weight change.   HENT: Negative for dental problem, ear pain, facial swelling, postnasal drip and sinus pain.    Eyes: Negative for pain and discharge.   Respiratory: Negative for apnea, chest tightness,  Q4H PRN Erin Quintanilla MD        benzocaine-menthol (DERMOPLAST) 20-0.5 % topical spray   Topical PRN Erin Quintanilla MD        [COMPLETED] bupivacaine in dextrose (MARCAINE SPINAL) 0.75-8.25 % injection   Intrathecal  Rosibel Barraza DO   1.3 mL at 03/17/24 0521    carboprost (HEMABATE) injection 250 mcg  250 mcg Intramuscular PRN Erin Quintanilla MD        [COMPLETED] ceFAZolin 2000 mg IVPB in 100 mL NS (MBP)  2,000 mg Intravenous Once High, Caesar DELCID MD   2,000 mg at 03/17/24 0448    diphenhydrAMINE (BENADRYL) capsule 25 mg  25 mg Oral Q4H PRN Erin Quintanilla MD        diphenhydrAMINE (BENADRYL) injection 25 mg  25 mg Intravenous Once PRN Rosibel Barraza DO        docusate sodium (COLACE) capsule 100 mg  100 mg Oral BID PRN Erin Quintanilla MD        Hydrocortisone (Perianal) (ANUSOL-HC) 2.5 % rectal cream 1 Application  1 Application Rectal PRN Erin Quintanilla MD        ketorolac (TORADOL) injection 15 mg  15 mg Intravenous Q6H Erin Quintanilla MD   15 mg at 03/17/24 1833    Followed by    [START ON 3/18/2024] ibuprofen (ADVIL,MOTRIN) tablet 600 mg  600 mg Oral Q6H Erin Quintanilla MD        [COMPLETED] ketorolac (TORADOL) injection 30 mg  30 mg Intravenous Once High, Caesar DELCID MD   30 mg at 03/17/24 0641    [COMPLETED] lactated ringers bolus 1,000 mL  1,000 mL Intravenous Once High, Caesar DELCID MD 4,000 mL/hr at 03/17/24 0420 1,000 mL at 03/17/24 0420    lanolin topical 1 Application  1 Application Topical Q1H PRN Erin Quintanilla MD        methylergonovine (METHERGINE) injection 200 mcg  200 mcg Intramuscular PRN Erin Quintanilla MD        miSOPROStol (CYTOTEC) tablet 600 mcg  600 mcg Oral PRN Erin Quintanilla MD        naloxone (NARCAN) injection 0.4 mg  0.4 mg Intravenous Q1H PRN Rosibel Barraza DO        ondansetron (ZOFRAN) injection 4 mg  4 mg Intravenous Once PRN Rosibel Barraza DO        ondansetron ODT (ZOFRAN-ODT) disintegrating tablet  shortness of breath and wheezing.    Cardiovascular: Negative for chest pain, palpitations and leg swelling.   Gastrointestinal: Negative for abdominal distention, abdominal pain, blood in stool, constipation, diarrhea and vomiting.        Abdominal pain that has been worked up with a CT of the abdomen and it was unremarkable   Endocrine: Negative for cold intolerance, heat intolerance and polyuria.   Genitourinary: Negative for decreased urine volume, difficulty urinating and flank pain.   Musculoskeletal: Negative for arthralgias, back pain, joint swelling and neck pain.   Skin: Negative for color change.   Allergic/Immunologic: Negative for environmental allergies and immunocompromised state.   Neurological: Negative for syncope, facial asymmetry, speech difficulty, weakness, light-headedness, numbness and headaches.   Psychiatric/Behavioral: Negative for confusion, hallucinations and sleep disturbance.       Vitals:    08/08/22 0911 08/08/22 0913   BP: 132/62 122/80   BP Location: LUE - Left upper extremity LUE - Left upper extremity   Patient Position: Sitting Standing   Cuff Size: Regular Regular   Pulse: 88    Temp: 95.5 °F (35.3 °C)    SpO2: 96%    Weight: 107 kg (236 lb)    Height: 6' 10\" (2.083 m)         Physical Exam   Constitutional: He appears healthy. No distress.   HENT:   Mouth/Throat: Oropharynx is clear. Pharynx is normal.   Eyes: Pupils are equal, round, and reactive to light. Conjunctivae are normal.   Neck: No JVD present. No neck adenopathy. No thyromegaly present.   Cardiovascular: Normal rate, regular rhythm, S1 normal, S2 normal, normal heart sounds, intact distal pulses and normal pulses. PMI is not displaced.   No murmur heard.  Pulses:       Carotid pulses are 2+ on the right side and 2+ on the left side.       Radial pulses are 2+ on the right side and 2+ on the left side.        Dorsalis pedis pulses are 2+ on the right side and 2+ on the left side.        Posterior tibial pulses are  "4 mg  4 mg Oral Q6H PRN Erin Quintanilla MD        Or    ondansetron (ZOFRAN) injection 4 mg  4 mg Intravenous Q6H PRN Erin Quintanilla MD        oxyCODONE (ROXICODONE) immediate release tablet 5 mg  5 mg Oral Q4H PRN Erin Quintanilla MD   5 mg at 24 0755    Or    oxyCODONE (ROXICODONE) immediate release tablet 10 mg  10 mg Oral Q4H PRN Erin Quintanilla MD        [] oxytocin (PITOCIN) 30 units in 0.9% sodium chloride 500 mL (premix)  250 mL/hr Intravenous Continuous High, Caesar DELCID MD        oxytocin (PITOCIN) 30 units in 0.9% sodium chloride 500 mL (premix)  125 mL/hr Intravenous Once PRN Erin Quintanilla MD        pantoprazole (PROTONIX) EC tablet 40 mg  40 mg Oral Q AM Erin Quintanilla MD   40 mg at 24 1052    polyethylene glycol (MIRALAX) packet 17 g  17 g Oral Daily Erin Quintanilla MD        prenatal vitamin tablet 1 tablet  1 tablet Oral Daily Erin Quintanilla MD        [COMPLETED] Sod Citrate-Citric Acid (BICITRA) oral solution 30 mL  30 mL Oral Once High, Caesar DELCID MD   30 mL at 24 0448    witch hazel-glycerin (TUCKS) pad   Topical PRN Erin Quintanilla MD         Orders (last 24 hrs)        Start     Ordered    24 1300  ibuprofen (ADVIL,MOTRIN) tablet 600 mg  Every 6 Hours        Placed in \"Followed by\" Linked Group    24 0911    24 1000  acetaminophen (TYLENOL) tablet 650 mg  Every 6 Hours        Placed in \"Followed by\" Linked Group    24 0911    24 0600  Discontinue Indwelling Urinary Catheter in AM  Once         24 0911    24 0600  CBC & Differential  Timed         24 0911    24 0000  Discontinue IV  Once         24 0911    24 0000  Remove Abdominal Dressing  Once         24 0911    24 1800  Ambulate Patient  2 Times Daily      Comments: After anesthesia wears off.    24 0911    24 1300  ketorolac (TORADOL) injection 15 mg  Every 6 Hours        Placed in \"Followed by\" Linked Group " 2+ on the right side and 2+ on the left side.   I did not appreciate an RV heave   Pulmonary/Chest: Effort normal and breath sounds normal. No stridor. He has no wheezes. He has no rales. He exhibits no tenderness.   Abdominal: Soft. Bowel sounds are normal. He exhibits no distension and no mass. There is no hepatomegaly. There is no abdominal tenderness.   Musculoskeletal:         General: No edema.   Neurological: He is alert and oriented to person, place, and time.   Skin: Skin is warm.        LABORATORY RESULTS:   No components found for: 66W  Fasting Status Hours 12  UNKNOWN R   14 R     Cholesterol <=199 mg/dL 217 High    228 High  R, CM   222 High  R, CM    Triglycerides <=149 mg/dL 92   59 R, CM   102 R, CM    HDL >=40 mg/dL 71   68 R, CM   65 R, CM    LDL <=129 mg/dL 128   148 High  R, CM   137 High  R, CM    Comment: OPTIMAL           <100   NEAR OPTIMAL      100 to 129   BORDERLINE HIGH   130 to 159   HIGH              160 to 189   VERY HIGH         >=190   Non-HDL Cholesterol mg/dL 146   160 CM   157 CM    Comment: Therapeutic Target:   CHD and risk equivalents  <130        Chest X-ray: A chest x-ray was not recently performed.    Results for orders placed or performed during the hospital encounter of 07/29/22   Electrocardiogram 12-Lead   Result Value Ref Range    Ventricular Rate EKG/Min (BPM) 65     Atrial Rate (BPM) 64     SC-Interval (MSEC) 163     QRS-Interval (MSEC) 91     QT-Interval (MSEC) 416     QTc 433     P Axis (Degrees) 68     R Axis (Degrees) 76     T Axis (Degrees) -108     REPORT TEXT       Sinus rhythm  Left ventricular hypertrophy  ST depressions, lateral leads   Abnormal T, cannot rule out ischemia, inferiror and lateral leads  Confirmed by fellow ROBINSON LOPEZ DO ([id number]) on 7/29/2022 10:52:14 AM  Confirmed by JD EVERETT MD ([id number]) on 7/29/2022 12:04:44 PM           WBC (K/mcL)   Date Value   07/31/2022 9.6     RBC (mil/mcL)   Date Value   07/31/2022 4.79     HCT (%)   Date     03/17/24 0911    03/17/24 1000  pantoprazole (PROTONIX) EC tablet 40 mg  Every Early Morning         03/17/24 0911    03/17/24 1000  Incentive spirometry RT  Every Hour       03/17/24 0911    03/17/24 1000  polyethylene glycol (MIRALAX) packet 17 g  Daily         03/17/24 0911    03/17/24 1000  prenatal vitamin tablet 1 tablet  Daily         03/17/24 0911    03/17/24 0912  Transfer Patient  Once         03/17/24 0911    03/17/24 0912  Code Status and Medical Interventions:  Continuous         03/17/24 0911    03/17/24 0912  Vital Signs Per hospital policy  Per Hospital Policy         03/17/24 0911    03/17/24 0912  Notify Physician  Until Discontinued         03/17/24 0911    03/17/24 0912  Patient May Shower  Once        Comments: After anesthesia wears off and with assistance    03/17/24 0911    03/17/24 0912  Diet: Regular/House; Fluid Consistency: Thin (IDDSI 0)  Diet Effective Now         03/17/24 0911    03/17/24 0912  Advance Diet As Tolerated -Regular  Until Discontinued         03/17/24 0911    03/17/24 0912  I/O  Every Shift       03/17/24 0911    03/17/24 0912  Fundal and Lochia Check  Per Hospital Policy        Comments: Q 30 min x 2, Q 1 hr x 4, Q 4 hrs x 24 hrs, then Q shift.    03/17/24 0911    03/17/24 0912  Straight Catheterize  Once        Comments: May Straight Cath One Time As Need for Inability to Void  If Necessary to Cath a Second Time, Insert Indwelling Urinary Catheter & Leave in If Residual is Greater Than 150 mL    03/17/24 0911    03/17/24 0912  Continue Indwelling Urinary Catheter Already in Place  Once         03/17/24 0911    03/17/24 0912  Notify Provider if Bladder Distention Continues  Until Discontinued         03/17/24 0911    03/17/24 0912  Urinary Catheter Care  Every Shift       03/17/24 0911    03/17/24 0912  Turn Cough Deep Breathe  Once         03/17/24 0911    03/17/24 0912  Encourage early intake of PO fluids  Continuous         03/17/24 0911    03/17/24 0912  Ambulate  Value   07/31/2022 43.0     HGB (g/dL)   Date Value   07/31/2022 14.8     PLT (K/mcL)   Date Value   07/31/2022 149     Sodium (mmol/L)   Date Value   07/30/2022 138     Potassium (mmol/L)   Date Value   07/30/2022 3.8     Chloride (mmol/L)   Date Value   07/30/2022 106     Glucose (mg/dL)   Date Value   07/30/2022 107 (H)     Calcium (mg/dL)   Date Value   07/30/2022 9.3     Carbon Dioxide (mmol/L)   Date Value   07/30/2022 27     BUN (mg/dL)   Date Value   07/30/2022 13     Creatinine (mg/dL)   Date Value   07/30/2022 0.86     Cholesterol (mg/dL)   Date Value   05/21/2021 217 (H)     HDL (mg/dL)   Date Value   05/21/2021 71     Cholesterol/ HDL Ratio (no units)   Date Value   05/21/2021 3.1     Triglycerides (mg/dL)   Date Value   05/21/2021 92     LDL (mg/dL)   Date Value   05/21/2021 128     GOT/AST (Units/L)   Date Value   07/29/2022 16     GPT/ALT (Units/L)   Date Value   07/29/2022 19     No results found for: GGTP  Alkaline Phosphatase (Units/L)   Date Value   07/29/2022 78     Bilirubin, Total (mg/dL)   Date Value   07/29/2022 1.0     TSH (mcUnits/mL)   Date Value   08/22/2019 3.391       ASSESSMENT AND PLAN:     T wave inversion in EKG  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  We will have a cardiac MRI    History of pulmonary embolism  No cardiovascular sequela.  No evidence of RV strain        SUMMARY:  Recent pulmonary embolism, unprovoked.  Still undergoing evaluation by hematology.  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  Unremarkable transthoracic echocardiogram will need now a cardiac MRI.  Explained extensively to patient as well as fiancé.  No evidence of sudden cardiac death.  At this point, no follow-up until MRI has been performed.  No restrictions in activity.      Return After cardiac MRI study and depending on results..      Bisi Godwin MD        Greater than 50% of the visit was spent counseling regarding above issues; total time spent 30  "Patient 3-5 times per day (with or without Mancia)  Every Shift       03/17/24 0911    03/17/24 0912  Apply Abdominal Binding Until Discontinued  Until Discontinued         03/17/24 0911    03/17/24 0912  \"If patient tolerates food and liquids after completion of second bag of Pitocin, saline lock IV and discontinue IV fluid infusions.  Once         03/17/24 0911    03/17/24 0912  Breast pump to bed  Once         03/17/24 0911    03/17/24 0912  If indicated -- Please administer RH Immunoglobulin based on results of cord blood evaluation and fetal screen lab tests, pharmacy to dispense  Continuous        Comments: See process instructions for reference range details.    03/17/24 0911 03/17/24 0912  Place Sequential Compression Device  Once         03/17/24 0911    03/17/24 0912  Maintain Sequential Compression Device  Continuous         03/17/24 0911    03/17/24 0911  oxytocin (PITOCIN) 30 units in 0.9% sodium chloride 500 mL (premix)  Once As Needed         03/17/24 0911    03/17/24 0911  acetaminophen (TYLENOL) tablet 1,000 mg  Every 6 Hours        Placed in \"Followed by\" Linked Group    03/17/24 0911    03/17/24 0911  docusate sodium (COLACE) capsule 100 mg  2 Times Daily PRN         03/17/24 0911    03/17/24 0911  ondansetron ODT (ZOFRAN-ODT) disintegrating tablet 4 mg  Every 6 Hours PRN        Placed in \"Or\" Linked Group    03/17/24 0911    03/17/24 0911  ondansetron (ZOFRAN) injection 4 mg  Every 6 Hours PRN        Placed in \"Or\" Linked Group    03/17/24 0911    03/17/24 0911  lanolin topical 1 Application  Every 1 Hour PRN         03/17/24 0911    03/17/24 0911  carboprost (HEMABATE) injection 250 mcg  As Needed         03/17/24 0911    03/17/24 0911  miSOPROStol (CYTOTEC) tablet 600 mcg  As Needed         03/17/24 0911    03/17/24 0911  methylergonovine (METHERGINE) injection 200 mcg  As Needed         03/17/24 0911    03/17/24 0911  diphenhydrAMINE (BENADRYL) capsule 25 mg  Every 4 Hours PRN         " minutes.      "03/17/24 0911    03/17/24 0911  benzocaine-menthol (DERMOPLAST) 20-0.5 % topical spray  As Needed         03/17/24 0911    03/17/24 0911  witch hazel-glycerin (TUCKS) pad  As Needed         03/17/24 0911    03/17/24 0911  Hydrocortisone (Perianal) (ANUSOL-HC) 2.5 % rectal cream 1 Application  As Needed         03/17/24 0911    03/17/24 0911  aluminum-magnesium hydroxide-simethicone (MAALOX MAX) 400-400-40 MG/5ML suspension 15 mL  Every 4 Hours PRN,   Status:  Discontinued         03/17/24 0911    03/17/24 0911  aluminum-magnesium hydroxide-simethicone (MAALOX MAX) 400-400-40 MG/5ML suspension 15 mL  Every 4 Hours PRN        Placed in \"Or\" Linked Group    03/17/24 0911    03/17/24 0911  calcium carbonate (TUMS) chewable tablet 500 mg (200 mg elemental)  Every 4 Hours PRN        Placed in \"Or\" Linked Group    03/17/24 0911    03/17/24 0900  sodium chloride 0.9 % flush 10 mL  Every 12 Hours Scheduled,   Status:  Discontinued         03/17/24 0357    03/17/24 0752  oxyCODONE (ROXICODONE) immediate release tablet 5 mg  Every 4 Hours PRN        Placed in \"Or\" Linked Group    03/17/24 0752    03/17/24 0752  oxyCODONE (ROXICODONE) immediate release tablet 10 mg  Every 4 Hours PRN        Placed in \"Or\" Linked Group    03/17/24 0752    03/17/24 0750  ondansetron (ZOFRAN) injection 4 mg  Once As Needed         03/17/24 0750    03/17/24 0750  naloxone (NARCAN) injection 0.4 mg  Every 1 Hour PRN         03/17/24 0750 03/17/24 0730  oxytocin (PITOCIN) 30 units in 0.9% sodium chloride 500 mL (premix)  Continuous        Placed in \"Followed by\" Linked Group    03/17/24 0627    03/17/24 0715  ketorolac (TORADOL) injection 30 mg  Once         03/17/24 0627 03/17/24 0700  Respirations  Every Hour      Comments: If respiratory rate is less than 10/min, notify the Anesthesiologist    03/17/24 0627 03/17/24 0628  Notify Provider (Specified)  Until Discontinued         03/17/24 0627 03/17/24 0628  Vital Signs Per Hospital " "Policy  Per Hospital Policy         03/17/24 0627 03/17/24 0628  Strict Bed Rest  Until Discontinued         03/17/24 0627 03/17/24 0628  Fundal & Lochia Check  Per Order Details        Comments: Every 15 Minutes x4, Then Every 30 Minutes x2, Then Every Shift    03/17/24 0627 03/17/24 0628  Fundal & Lochia Check  Every Shift       03/17/24 0627 03/17/24 0628  Diet: Regular/House; Fluid Consistency: Thin (IDDSI 0)  Diet Effective Now,   Status:  Canceled         03/17/24 0627 03/17/24 0628  Vital Signs  Per Hospital Policy         03/17/24 0627 03/17/24 0628  If Respiratory Rate is Less Than 8/Min, See Narcan Order. Notify Anesthesiologist STAT.  Continuous         03/17/24 0627 03/17/24 0628  Blood Pressure and Pulse Every 4 Hours  Continuous,   Status:  Canceled         03/17/24 0627 03/17/24 0628  Activity & Removal of Mancia Catheter, Per Obstetrician  Continuous,   Status:  Canceled         03/17/24 0627 03/17/24 0628  Notify Anesthesiologist for Any Questions / Problems  Continuous,   Status:  Canceled         03/17/24 0627 03/17/24 0628  Notify Anesthesia for Temp Over 101.4F  Continuous,   Status:  Canceled        Comments: May discharge from PACU with temperature at or above 95 degrees.    03/17/24 0627 03/17/24 0628  Ambu Bag at Bedside  Continuous         03/17/24 0627 03/17/24 0627  diphenhydrAMINE (BENADRYL) capsule 25 mg  Every 4 Hours PRN,   Status:  Discontinued        Placed in \"Or\" Linked Group    03/17/24 0627 03/17/24 0627  diphenhydrAMINE (BENADRYL) injection 25 mg  Once As Needed        Placed in \"Or\" Linked Group    03/17/24 0627 03/17/24 0627  diphenhydrAMINE (BENADRYL) injection 25 mg  Every 4 Hours PRN,   Status:  Discontinued        Placed in \"Or\" Linked Group    03/17/24 0627 03/17/24 0627  oxytocin (PITOCIN) 30 units in 0.9% sodium chloride 500 mL (premix)  Once,   Status:  Discontinued        Placed in \"Followed by\" Linked Group    03/17/24 " 0627    03/17/24 0627  methylergonovine (METHERGINE) injection 200 mcg  Once As Needed,   Status:  Discontinued         03/17/24 0627    03/17/24 0627  carboprost (HEMABATE) injection 250 mcg  As Needed,   Status:  Discontinued         03/17/24 0627 03/17/24 0627  miSOPROStol (CYTOTEC) tablet 800 mcg  As Needed,   Status:  Discontinued         03/17/24 0627    03/17/24 0602  Tissue Pathology Exam  Once         03/17/24 0602    03/17/24 0546  Tissue Pathology Exam  Once         03/17/24 0545    03/17/24 0500  ABO RH Specimen Verification  STAT         03/17/24 0435    03/17/24 0445  lactated ringers bolus 1,000 mL  Once         03/17/24 0357    03/17/24 0445  lactated ringers infusion  Continuous,   Status:  Discontinued         03/17/24 0357 03/17/24 0445  Sod Citrate-Citric Acid (BICITRA) oral solution 30 mL  Once         03/17/24 0357    03/17/24 0445  acetaminophen (TYLENOL) tablet 1,000 mg  Once         03/17/24 0357    03/17/24 0445  ceFAZolin 2000 mg IVPB in 100 mL NS (MBP)  Once         03/17/24 0357    03/17/24 0411  POC Amnisure  Once         03/17/24 0410    03/17/24 0400  Vital Signs q 4 while awake  Every 4 Hours,   Status:  Canceled      Comments: While the patient is awake.    03/17/24 0357 03/17/24 0357  NPO Diet NPO Type: Ice Chips  Diet Effective Now,   Status:  Canceled         03/17/24 0357 03/17/24 0356  Admit To Obstetrics Inpatient  Once         03/17/24 0357 03/17/24 0356  Code Status and Medical Interventions:  Continuous,   Status:  Canceled         03/17/24 0357 03/17/24 0356  Obtain Informed Consent  Once,   Status:  Canceled         03/17/24 0357 03/17/24 0356  Vital Signs Per Hospital Policy  Per Hospital Policy,   Status:  Canceled         03/17/24 0357 03/17/24 0356  Continuous Fetal Monitoring With NST on Admission and Prior to Initiation of Oxytocin.  Per Order Details,   Status:  Canceled        Comments: Continuous Fetal Monitoring With NST on Admission     "03/17/24 0357    03/17/24 0356  External Uterine Contraction Monitoring  Per Hospital Policy,   Status:  Canceled         03/17/24 0357    03/17/24 0356  Notify Provider (Specified)  Until Discontinued,   Status:  Canceled         03/17/24 0357 03/17/24 0356  Notify Provider of Tachysystole (Per Hospital Algorithm)  Until Discontinued,   Status:  Canceled         03/17/24 0357 03/17/24 0356  Notify Provider if Membranes Ruptured, Bleeding Greater Than 1 Pad Per Hour, Fetal Heart Tone Abnormality or Severe Pain  Until Discontinued,   Status:  Canceled         03/17/24 0357 03/17/24 0356  Insert Indwelling Urinary Catheter  Once,   Status:  Canceled        Placed in \"And\" Linked Group    03/17/24 0357 03/17/24 0356  Assess Need for Indwelling Urinary Catheter - Follow Removal Protocol  Continuous,   Status:  Canceled        Comments: Indwelling Urinary Catheter Removal Criteria  Discontinue Indwelling Urinary Catheter Unless One of the Following is Present:  Urinary Retention or Obstruction  Chronic Urinary Catheter Use  End of Life  Critical Illness with Strict I/O   Tract or Abdominal Surgery  Stage 3/4 Sacral / Perineal Wound  Required Activity Restriction: Trauma  Required Activity Restriction: Spine Surgery  If Patient is Being Followed by Urology Contact Them PRIOR to Removal  Do Not Remove Indwelling Urinary Catheter Order is Present with a CLINICAL REASON to Maintain the Catheter. Provider is Required to Include a Clinical Reason to Maintain a Urinary Catheter    Patient Admitted With Indwelling Urinary Catheter (Not Placed at [facility])  Assess for Continued Need & Document Medical Necessity  If Infection is Suspected, Contact the Provider       Placed in \"And\" Linked Group    03/17/24 0357    03/17/24 0356  Urinary Catheter Care  Every Shift,   Status:  Canceled      Placed in \"And\" Linked Group    03/17/24 0357    03/17/24 0356  Abdominal Prep with Clippers  Once,   Status:  Canceled    "      03/17/24 0357 03/17/24 0356  Chlorhexadine Skin Prep Unless Otherwise Indicated  Once,   Status:  Canceled         03/17/24 0357 03/17/24 0356  SCD (sequential compression devices)  Once,   Status:  Canceled         03/17/24 0357 03/17/24 0356  POC Glucose Once  Once,   Status:  Canceled         03/17/24 0357 03/17/24 0356  Document Gatorade Consumption Prior to Admission (Yes or No)  Once,   Status:  Canceled         03/17/24 0357 03/17/24 0356  Inpatient Consult to Anesthesiology  Once,   Status:  Canceled        Specialty:  Anesthesiology  Provider:  (Not yet assigned)    03/17/24 0357 03/17/24 0356  Type & Screen  Once         03/17/24 0357 03/17/24 0356  CBC (No Diff)  STAT         03/17/24 0357 03/17/24 0356  T Pallidum Antibody w/ reflex RPR (Syphilis)  Once         03/17/24 0357 03/17/24 0356  Insert Peripheral IV  Once,   Status:  Canceled         03/17/24 0357 03/17/24 0356  Saline Lock & Maintain IV Access  Continuous,   Status:  Canceled         03/17/24 0357 03/17/24 0355  sodium chloride 0.9 % flush 10 mL  As Needed,   Status:  Discontinued         03/17/24 0357 03/17/24 0355  sodium chloride 0.9 % infusion 40 mL  As Needed,   Status:  Discontinued         03/17/24 0357 03/17/24 0355  lidocaine PF 1% (XYLOCAINE) injection 0.5 mL  Once As Needed,   Status:  Discontinued         03/17/24 0357    Unscheduled  IV Site Care  As Needed       03/17/24 0627    Unscheduled  Blood Gas, Arterial -With Co-Ox Panel: Yes  As Needed       03/17/24 0627    Unscheduled  Up with Assistance  As Needed       03/17/24 0911    Unscheduled  Bladder Scan if Patient Unable to Void 4-6 Hours After Catheter Removal  As Needed         03/17/24 0911    Unscheduled  Straight Cath Every 4-6 Hours As Needed If Patient is Unable to Void After 4-6 Hours, Bladder Scan Volume is Greater Than 500mL & Patient Has Symptoms of Bladder Discomfort / Distention  As Needed       03/17/24 0911     Unscheduled  Schedule / Prompt Voiding For Patients With Urinary Incontinence  As Needed       24    Unscheduled  Wound Care  As Needed      Comments: Postop day 1. Remove dressing and leave incision open to air.    24    Unscheduled  KPad  As Needed      Comments: PRN pain    24    Unscheduled  Apply Ice Pack to Perineum  As Needed      Comments: For 20 min q 2hrs    24    Unscheduled  Apply Waffle Cushion  As Needed      Comments: PRN perineal discomfort    24    Unscheduled  Chewing Gum  As Needed       24    Unscheduled  Warm compress  As Needed       24    Unscheduled  Apply ace wrap, tight bra, or binder  As Needed       24    Unscheduled  Apply ice packs  As Needed       24    --  omeprazole (priLOSEC) 20 MG capsule  Daily         24    --  Prenatal Vit-Fe Fumarate-FA (Prenatal 27-1) 27-1 MG tablet tablet  Daily         24                     Operative/Procedure Notes (last 24 hours)        Erin Quintanilla MD at 24 0532           Section With Bilateral Salpingectomy  Procedure Note    Jaja Sutton    3/17/2024         Pre-operative Diagnosis:   1. IUP at 34w5d     2. PPROM     3. Previous CS, desires RCS       4. Desires permanent sterilization  5.  Advanced maternal age    Post-operative Diagnosis: same    Findings: Normal maternal anatomy    Estimated Blood Loss: 500.  QBL pending           Drains: Mancia                 Specimens: bilateral fallopian tubes              Complications:  None; patient tolerated the procedure well.           Disposition: PACU - hemodynamically stable.           Condition: stable    Surgeon: Erin Quintanilla MD     Assistants: Tech    Anesthesia: Spinal anesthesia    ASA Class: 2    Procedure Details   The patient was seen in the Holding Room. The risks, benefits, complications, treatment options, and expected outcomes were discussed with the  patient.  The patient concurred with the proposed plan, giving informed consent.  The site of surgery was properly noted. The patient was taken to the Operating Room, identified as Jaja Sutton and the procedure verified as  Delivery. A Time Out was held and the above information confirmed.    After induction of anesthesia, the patient was draped and prepped in the usual sterile manner. A Pfannenstiel incision was made and carried down through the subcutaneous tissue to the fascia. A fascial incision was made and extended transversely. The fascia was  from the underlying rectus tissue superiorly and inferiorly. The peritoneum was identified and entered. The peritoneal incision was extended longitudinally.  A low transverse uterine incision was made. Delivered from the vertex presentation was a male infant with birth weight 2240 g (4 lb 15 oz)  with apgars scores of         APGARS  One minute Five minutes Ten minutes Fifteen minutes Twenty minutes   Skin color: 0   1             Heart rate: 1   2             Grimace: 2   2              Muscle tone: 1   1              Breathin   1              Totals: 5   7              . After the umbilical cord was clamped and cut, cord blood was obtained for evaluation. The placenta was removed intact and appeared normal. The uterine outline, tubes and ovaries appeared normal. The uterine incision was closed with running locked sutures of 1 chromic.  There was bleeding from the right uterine artery.  This was ligated with an O'Pilot suture of 1 chromic superiorly and inferiorly to the hysterotomy.  Hemostasis was observed.  Shanon powder was placed to assure continued excellent hemostasis      The right fallopian tube was elevated with a Trent clamp.  The Enseal was used to sequentially coagulate and cut the parametrium from the fimbria to the cornua, where the tube was then transected and handed off to pathology.  The same procedure was repeated on the left  "fallopian tube which was removed and sent to pathology.  Hemostasis along the surgical planes was noted.  The fascia was then reapproximated with running sutures of 0 PDS. The subcutaneous tissue was reapproximated with 3-0 plain. The skin was reapproximated with insorb subcuticular staples and reinforced with skin glue.      Instrument, sponge, and needle counts were correct prior the abdominal closure and at the conclusion of the case.         Erin Quintanilla MD  10:19 EDT  3/17/2024         Electronically signed by Erin Quintanilla MD at 24 1019       Physician Progress Notes (last 24 hours)  Notes from 24 through 24   No notes of this type exist for this encounter.          Date of Birth   1985    Social Security Number       Address   [address] [place] [address]    Home Phone   [phone]    MRN   [id number]       Sikh   None    Marital Status   Single                            Admission Date   3/17/24    Admission Type   Elective    Admitting Provider   Erin Quintanilla MD    Attending Provider   Erin Quintanilla MD    Department, Room/Bed   [facility] MOTHER BABY 4B, N433/1       Discharge Date       Discharge Disposition       Discharge Destination                                 Attending Provider: Erin Quintanilla MD    Allergies: No Known Allergies    Isolation: None   Infection: None   Code Status: CPR    Ht: 154.9 cm (61\")   Wt: 81.6 kg (180 lb)    Admission Cmt: None   Principal Problem:  premature rupture of membranes (PPROM) with onset of labor within 24 hours of rupture in third trimester, antepartum [O42.013]                   Active Insurance as of 3/17/2024       Primary Coverage       Payor Plan Insurance Group Employer/Plan Group    [employer] [place] [employer]        Payor Plan Address Payor Plan Phone Number Payor Plan Fax Number Effective Dates    [address] [address] [phone]  2024 - None " [facility] [id number]         Subscriber Name Subscriber Birth Date Member ID       EVAN TRUONG 1985 [id number]                   Emergency Contacts        (Rel.) Home Phone Work Phone Mobile Phone    DANGELO DAY (Mother) -- -- [phone]

## 2024-03-19 LAB
CYTO UR: NORMAL
CYTO UR: NORMAL
LAB AP CASE REPORT: NORMAL
LAB AP CASE REPORT: NORMAL
LAB AP CLINICAL INFORMATION: NORMAL
LAB AP CLINICAL INFORMATION: NORMAL
PATH REPORT.FINAL DX SPEC: NORMAL
PATH REPORT.FINAL DX SPEC: NORMAL
PATH REPORT.GROSS SPEC: NORMAL
PATH REPORT.GROSS SPEC: NORMAL

## 2024-03-19 RX ORDER — FERROUS SULFATE 325(65) MG
325 TABLET ORAL
Status: DISCONTINUED | OUTPATIENT
Start: 2024-03-20 | End: 2024-03-21 | Stop reason: HOSPADM

## 2024-03-19 RX ADMIN — ACETAMINOPHEN 650 MG: 325 TABLET ORAL at 23:01

## 2024-03-19 RX ADMIN — IBUPROFEN 600 MG: 600 TABLET, FILM COATED ORAL at 18:59

## 2024-03-19 RX ADMIN — ACETAMINOPHEN 650 MG: 325 TABLET ORAL at 04:59

## 2024-03-19 RX ADMIN — ACETAMINOPHEN 650 MG: 325 TABLET ORAL at 09:45

## 2024-03-19 RX ADMIN — DOCUSATE SODIUM 100 MG: 100 CAPSULE, LIQUID FILLED ORAL at 23:02

## 2024-03-19 RX ADMIN — IBUPROFEN 600 MG: 600 TABLET, FILM COATED ORAL at 06:39

## 2024-03-19 RX ADMIN — IBUPROFEN 600 MG: 600 TABLET, FILM COATED ORAL at 12:30

## 2024-03-19 RX ADMIN — POLYETHYLENE GLYCOL 3350 17 G: 17 POWDER, FOR SOLUTION ORAL at 09:45

## 2024-03-19 RX ADMIN — FERROUS SULFATE TAB 325 MG (65 MG ELEMENTAL FE) 325 MG: 325 (65 FE) TAB at 09:45

## 2024-03-19 RX ADMIN — ACETAMINOPHEN 650 MG: 325 TABLET ORAL at 15:38

## 2024-03-19 RX ADMIN — PANTOPRAZOLE SODIUM 40 MG: 40 TABLET, DELAYED RELEASE ORAL at 06:39

## 2024-03-19 RX ADMIN — PRENATAL VITAMINS-IRON FUMARATE 27 MG IRON-FOLIC ACID 0.8 MG TABLET 1 TABLET: at 09:45

## 2024-03-19 RX ADMIN — IBUPROFEN 600 MG: 600 TABLET, FILM COATED ORAL at 00:58

## 2024-03-19 NOTE — PAYOR COMM NOTE
"Evan Sutton (38 y.o. Female)     [employer] ID#[id number]    Delivery information:  C Section 3/17/24 @ 05:37  Wt 2240 gms  Male  Apgars 5/7    From:Sowmya Jewell LPN, Utilization Review  Phone #[phone]  Fax #[phone]        Date of Birth   1985    Social Security Number       Address   [address] [place] [address]    Home Phone   [phone]    MRN   [id number]       Pentecostalism   None    Marital Status   Single                            Admission Date   3/17/24    Admission Type   Elective    Admitting Provider   Erin Quintanilla MD    Attending Provider   Erin Quintanilla MD    Department, Room/Bed   [facility] MOTHER BABY 4B, N433/1       Discharge Date       Discharge Disposition       Discharge Destination                                 Attending Provider: Erin Quintanilla MD    Allergies: No Known Allergies    Isolation: None   Infection: None   Code Status: CPR    Ht: 154.9 cm (61\")   Wt: 81.6 kg (180 lb)    Admission Cmt: None   Principal Problem:  premature rupture of membranes (PPROM) with onset of labor within 24 hours of rupture in third trimester, antepartum [O42.013]                   Active Insurance as of 3/17/2024       Primary Coverage       Payor Plan Insurance Group Employer/Plan Group    [employer] [place] [employer] [place]        Payor Plan Address Payor Plan Phone Number Payor Plan Fax Number Effective Dates    [address] [address] [phone]  2024 - None Entered    [facility] [id number]         Subscriber Name Subscriber Birth Date Member ID       [id number] 1985 [id number]                     Emergency Contacts        (Rel.) Home Phone Work Phone Mobile Phone    DANGELO DAY (Mother) -- -- [phone]              Insurance Information                  [facility] MEDICAID Phone: [phone]    Subscriber: Evan Sutton Subscriber#: [id number]    Group#: -- Precert#: --             History & Physical    "     Caesar Armenta MD at 24 0349          Jaja Sutton  1985  [id number]  [id number]    CC: leaking and contractions  HPI:  Patient is 38 y.o. female   currently at 34w5d presents with c/o SROM at~0150 with nearly immed onset contractions.  Pt denies vag bleeding.  Good FM.  PNC comp by prev  and adv mat age.     PMH:  Current meds: PNV, prilosec  Illnesses: none  Surgeries: , D and C X 2, oral surg  Allergies: NKDA    Past OB History:       OB History    Para Term  AB Living   4 1 1 0 2 1   SAB IAB Ectopic Molar Multiple Live Births   2 0 0 0 0 0      # Outcome Date GA Lbr [place]/2nd Weight Sex Type Anes PTL Lv   4 Current            3 Term            2 SAB            1 SAB               Obstetric Comments   SAB 2009   SAB 2012   10/02/2014  41 weeks male 8-11 oz c/s            SH: tob neg , EtOH newg, drugs neg    General ROS: contractions, leaking, edema.   All other systems reviewed and are negative.      Physical Examination: General appearance - alert, well appearing, and in no distress  Vital signs - There were no vitals taken for this visit.  HEENT: normocephalic, atraumatic,oropharynx clear, appearance of ears and nose normal  Neck - supple, no significant adenopathy, no thyromegaly  Lymphatics - no palpable lymphadenopathy in the neck or groin, no hepatosplenomegaly  Chest - clear to auscultation, no wheezes, rales or rhonchi, respiratory effort non-labored  Heart - normal rate, regular rhythm, no murmurs, rubs, clicks or gallops, no JVD, trace lower extremity edema  Abdomen - soft, nontender, nondistended, no masses, no hepatosplenomegaly  no rebound tenderness noted, bowel sounds normal  Vaginal Exam: 2/80%/-2, grossly ruptured, amnisure pos ,external genitalia normal  Extremities - trace pedal edema noted, no calf tend  Skin -warm and dry, normal coloration and turgor, no rashes, no suspicious skin lesions noted      Fetal monitoring: indication contractions ,  "onset 0314 , offset 0350 , baseline 135 , mod BTB variability , multiple accels (15 X 15), no decels, q2-4\" contractions, interpretation reactive NST    Radiology     Assessment 1)IUP 34 5/7 weeks   2)PPROM with onset labor    3)prev    4)desires steril- after discussion of types and risks and benefits of each,    Pt desires bilat salpingectomy    Plan 1)admit   2)repeat  with bilat salpingectomy    Caesar Armenta MD  3/17/2024  03:50 EDT                                                                       Electronically signed by Caesar Armenta MD at 24 0354       H&P signed by [employer], [place] at 24 0714         [Media Unavailable] Scan on 3/17/2024 0713 by [employer], [place]: Prenatal Record          Electronically signed by [employer], [place] at 24 0714       Facility-Administered Medications as of 3/19/2024   Medication Dose Route Frequency Provider Last Rate Last Admin    [COMPLETED] acetaminophen (TYLENOL) tablet 1,000 mg  1,000 mg Oral Once Caesar Armenta MD   1,000 mg at 24 0432    [COMPLETED] acetaminophen (TYLENOL) tablet 1,000 mg  1,000 mg Oral Q6H Erin Quintanilla MD   1,000 mg at 24 0348    Followed by    acetaminophen (TYLENOL) tablet 650 mg  650 mg Oral Q6H Erin Quintanilla MD   650 mg at 24 0459    aluminum-magnesium hydroxide-simethicone (MAALOX MAX) 400-400-40 MG/5ML suspension 15 mL  15 mL Oral Q4H PRN Erin Quintanilla MD   15 mL at 24 1736    Or    calcium carbonate (TUMS) chewable tablet 500 mg (200 mg elemental)  1 tablet Oral Q4H PRN Erin Quintanilla MD   1 tablet at 24 1320    benzocaine-menthol (DERMOPLAST) 20-0.5 % topical spray   Topical PRN Erin Quintanilla MD        [COMPLETED] bupivacaine in dextrose (MARCAINE SPINAL) 0.75-8.25 % injection   Intrathecal  Rosibel Barraza DO   1.3 mL at 24 0521    carboprost (HEMABATE) injection 250 mcg  250 mcg Intramuscular PRN Erin Quintanilla MD        " [COMPLETED] ceFAZolin 2000 mg IVPB in 100 mL NS (MBP)  2,000 mg Intravenous Once High, Caesar DELCID MD   2,000 mg at 24 0448    diphenhydrAMINE (BENADRYL) capsule 25 mg  25 mg Oral Q4H PRN Erin Quintanilla MD        diphenhydrAMINE (BENADRYL) injection 25 mg  25 mg Intravenous Once PRN Rosibel Barraza DO        docusate sodium (COLACE) capsule 100 mg  100 mg Oral BID PRN Erin Quintanilla MD   100 mg at 24 2244    ferrous sulfate tablet 325 mg  325 mg Oral Daily With Breakfast Roseline Lezama, CNM   325 mg at 24 1038    Hydrocortisone (Perianal) (ANUSOL-HC) 2.5 % rectal cream 1 Application  1 Application Rectal PRN Erin Quintanilla MD        [COMPLETED] ketorolac (TORADOL) injection 15 mg  15 mg Intravenous Q6H Erin Quintanilla MD   15 mg at 24 0642    Followed by    ibuprofen (ADVIL,MOTRIN) tablet 600 mg  600 mg Oral Q6H Erin Quintanilla MD   600 mg at 24 0639    [COMPLETED] ketorolac (TORADOL) injection 30 mg  30 mg Intravenous Once High, Caesar DELCID MD   30 mg at 24 0641    [COMPLETED] lactated ringers bolus 1,000 mL  1,000 mL Intravenous Once High, Caesar DELCID MD 4,000 mL/hr at 24 0420 1,000 mL at 24 0420    lanolin topical 1 Application  1 Application Topical Q1H PRN Erin Quintanilla MD        methylergonovine (METHERGINE) injection 200 mcg  200 mcg Intramuscular PRN Erin Quintanilla MD        miSOPROStol (CYTOTEC) tablet 600 mcg  600 mcg Oral PRN Erin Quintanilla MD        [] naloxone (NARCAN) injection 0.4 mg  0.4 mg Intravenous Q1H PRN Rosibel Barraza DO        [] ondansetron (ZOFRAN) injection 4 mg  4 mg Intravenous Once PRN Rosibel Barraza DO        ondansetron ODT (ZOFRAN-ODT) disintegrating tablet 4 mg  4 mg Oral Q6H PRN Erin Quintanilla MD        Or    ondansetron (ZOFRAN) injection 4 mg  4 mg Intravenous Q6H PRN Erin Quintanilla MD        oxyCODONE (ROXICODONE) immediate release tablet 5 mg  5 mg Oral  "Q4H PRN Erin Quintanilla MD   5 mg at 24 0755    Or    oxyCODONE (ROXICODONE) immediate release tablet 10 mg  10 mg Oral Q4H PRN Erin Quintanilla MD        [] oxytocin (PITOCIN) 30 units in 0.9% sodium chloride 500 mL (premix)  250 mL/hr Intravenous Continuous HighCaesar MD        oxytocin (PITOCIN) 30 units in 0.9% sodium chloride 500 mL (premix)  125 mL/hr Intravenous Once PRN Erin Quintanilla MD        pantoprazole (PROTONIX) EC tablet 40 mg  40 mg Oral Q AM Erin Quintanilla MD   40 mg at 24 0639    polyethylene glycol (MIRALAX) packet 17 g  17 g Oral Daily Erin Quintanilla MD   17 g at 24 1038    prenatal vitamin tablet 1 tablet  1 tablet Oral Daily Erin Quintanilla MD   1 tablet at 24 1038    [COMPLETED] Sod Citrate-Citric Acid (BICITRA) oral solution 30 mL  30 mL Oral Once High, Caesar DELCID MD   30 mL at 24 0448    witch hazel-glycerin (TUCKS) pad   Topical PRN Erin Quintanilla MD         Orders (last 72 hrs)        Start     Ordered    24 1300  ibuprofen (ADVIL,MOTRIN) tablet 600 mg  Every 6 Hours        Placed in \"Followed by\" Linked Group    24 0911    24 1015  ferrous sulfate tablet 325 mg  Daily With Breakfast         24 0915    24 1000  acetaminophen (TYLENOL) tablet 650 mg  Every 6 Hours        Placed in \"Followed by\" Linked Group    24 0911    24 0600  Discontinue Indwelling Urinary Catheter in AM  Once         24 0911    24 0600  CBC & Differential  Timed         24 0911    24 0600  CBC Auto Differential  PROCEDURE ONCE         24 2202    24 0000  Discontinue IV  Once         24 0911    24 0000  Remove Abdominal Dressing  Once         24 0911    24 1800  Ambulate Patient  2 Times Daily      Comments: After anesthesia wears off.    24 0911    24 1300  ketorolac (TORADOL) injection 15 mg  Every 6 Hours        Placed in \"Followed by\" Linked Group "    03/17/24 0911    03/17/24 1000  pantoprazole (PROTONIX) EC tablet 40 mg  Every Early Morning         03/17/24 0911    03/17/24 1000  Incentive spirometry RT  Every Hour       03/17/24 0911    03/17/24 1000  polyethylene glycol (MIRALAX) packet 17 g  Daily         03/17/24 0911    03/17/24 1000  prenatal vitamin tablet 1 tablet  Daily         03/17/24 0911    03/17/24 0912  Transfer Patient  Once         03/17/24 0911    03/17/24 0912  Code Status and Medical Interventions:  Continuous         03/17/24 0911    03/17/24 0912  Vital Signs Per hospital policy  Per Hospital Policy         03/17/24 0911    03/17/24 0912  Notify Physician  Until Discontinued         03/17/24 0911    03/17/24 0912  Patient May Shower  Once        Comments: After anesthesia wears off and with assistance    03/17/24 0911    03/17/24 0912  Diet: Regular/House; Fluid Consistency: Thin (IDDSI 0)  Diet Effective Now         03/17/24 0911    03/17/24 0912  Advance Diet As Tolerated -Regular  Until Discontinued         03/17/24 0911    03/17/24 0912  I/O  Every Shift       03/17/24 0911    03/17/24 0912  Fundal and Lochia Check  Per Hospital Policy        Comments: Q 30 min x 2, Q 1 hr x 4, Q 4 hrs x 24 hrs, then Q shift.    03/17/24 0911    03/17/24 0912  Straight Catheterize  Once        Comments: May Straight Cath One Time As Need for Inability to Void  If Necessary to Cath a Second Time, Insert Indwelling Urinary Catheter & Leave in If Residual is Greater Than 150 mL    03/17/24 0911    03/17/24 0912  Continue Indwelling Urinary Catheter Already in Place  Once         03/17/24 0911    03/17/24 0912  Notify Provider if Bladder Distention Continues  Until Discontinued         03/17/24 0911    03/17/24 0912  Urinary Catheter Care  Every Shift,   Status:  Canceled       03/17/24 0911    03/17/24 0912  Turn Cough Deep Breathe  Once         03/17/24 0911    03/17/24 0912  Encourage early intake of PO fluids  Continuous         03/17/24 0911     "03/17/24 0912  Ambulate Patient 3-5 times per day (with or without Mancia)  Every Shift       03/17/24 0911    03/17/24 0912  Apply Abdominal Binding Until Discontinued  Until Discontinued         03/17/24 0911    03/17/24 0912  \"If patient tolerates food and liquids after completion of second bag of Pitocin, saline lock IV and discontinue IV fluid infusions.  Once         03/17/24 0911    03/17/24 0912  Breast pump to bed  Once         03/17/24 0911    03/17/24 0912  If indicated -- Please administer RH Immunoglobulin based on results of cord blood evaluation and fetal screen lab tests, pharmacy to dispense  Continuous        Comments: See process instructions for reference range details.    03/17/24 0911    03/17/24 0912  Place Sequential Compression Device  Once         03/17/24 0911    03/17/24 0912  Maintain Sequential Compression Device  Continuous         03/17/24 0911 03/17/24 0911  oxytocin (PITOCIN) 30 units in 0.9% sodium chloride 500 mL (premix)  Once As Needed         03/17/24 0911    03/17/24 0911  acetaminophen (TYLENOL) tablet 1,000 mg  Every 6 Hours        Placed in \"Followed by\" Linked Group    03/17/24 0911    03/17/24 0911  docusate sodium (COLACE) capsule 100 mg  2 Times Daily PRN         03/17/24 0911    03/17/24 0911  ondansetron ODT (ZOFRAN-ODT) disintegrating tablet 4 mg  Every 6 Hours PRN        Placed in \"Or\" Linked Group    03/17/24 0911    03/17/24 0911  ondansetron (ZOFRAN) injection 4 mg  Every 6 Hours PRN        Placed in \"Or\" Linked Group    03/17/24 0911    03/17/24 0911  lanolin topical 1 Application  Every 1 Hour PRN         03/17/24 0911    03/17/24 0911  carboprost (HEMABATE) injection 250 mcg  As Needed         03/17/24 0911    03/17/24 0911  miSOPROStol (CYTOTEC) tablet 600 mcg  As Needed         03/17/24 0911    03/17/24 0911  methylergonovine (METHERGINE) injection 200 mcg  As Needed         03/17/24 0911    03/17/24 0911  diphenhydrAMINE (BENADRYL) capsule 25 mg  Every 4 " "Hours PRN         03/17/24 0911    03/17/24 0911  benzocaine-menthol (DERMOPLAST) 20-0.5 % topical spray  As Needed         03/17/24 0911    03/17/24 0911  witch hazel-glycerin (TUCKS) pad  As Needed         03/17/24 0911    03/17/24 0911  Hydrocortisone (Perianal) (ANUSOL-HC) 2.5 % rectal cream 1 Application  As Needed         03/17/24 0911    03/17/24 0911  aluminum-magnesium hydroxide-simethicone (MAALOX MAX) 400-400-40 MG/5ML suspension 15 mL  Every 4 Hours PRN,   Status:  Discontinued         03/17/24 0911    03/17/24 0911  aluminum-magnesium hydroxide-simethicone (MAALOX MAX) 400-400-40 MG/5ML suspension 15 mL  Every 4 Hours PRN        Placed in \"Or\" Linked Group    03/17/24 0911    03/17/24 0911  calcium carbonate (TUMS) chewable tablet 500 mg (200 mg elemental)  Every 4 Hours PRN        Placed in \"Or\" Linked Group    03/17/24 0911    03/17/24 0900  sodium chloride 0.9 % flush 10 mL  Every 12 Hours Scheduled,   Status:  Discontinued         03/17/24 0357    03/17/24 0752  oxyCODONE (ROXICODONE) immediate release tablet 5 mg  Every 4 Hours PRN        Placed in \"Or\" Linked Group    03/17/24 0752    03/17/24 0752  oxyCODONE (ROXICODONE) immediate release tablet 10 mg  Every 4 Hours PRN        Placed in \"Or\" Linked Group    03/17/24 0752    03/17/24 0750  ondansetron (ZOFRAN) injection 4 mg  Once As Needed         03/17/24 0750    03/17/24 0750  naloxone (NARCAN) injection 0.4 mg  Every 1 Hour PRN         03/17/24 0750 03/17/24 0730  oxytocin (PITOCIN) 30 units in 0.9% sodium chloride 500 mL (premix)  Continuous        Placed in \"Followed by\" Linked Group    03/17/24 0627    03/17/24 0715  ketorolac (TORADOL) injection 30 mg  Once         03/17/24 0627 03/17/24 0700  Respirations  Every Hour      Comments: If respiratory rate is less than 10/min, notify the Anesthesiologist    03/17/24 0627 03/17/24 0628  Notify Provider (Specified)  Until Discontinued         03/17/24 0627 03/17/24 0628  Vital Signs " "Per Hospital Policy  Per Hospital Policy         03/17/24 0627 03/17/24 0628  Strict Bed Rest  Until Discontinued         03/17/24 0627 03/17/24 0628  Fundal & Lochia Check  Per Order Details        Comments: Every 15 Minutes x4, Then Every 30 Minutes x2, Then Every Shift    03/17/24 0627 03/17/24 0628  Fundal & Lochia Check  Every Shift       03/17/24 0627 03/17/24 0628  Diet: Regular/House; Fluid Consistency: Thin (IDDSI 0)  Diet Effective Now,   Status:  Canceled         03/17/24 0627 03/17/24 0628  Vital Signs  Per Hospital Policy         03/17/24 0627 03/17/24 0628  If Respiratory Rate is Less Than 8/Min, See Narcan Order. Notify Anesthesiologist STAT.  Continuous         03/17/24 0627 03/17/24 0628  Blood Pressure and Pulse Every 4 Hours  Continuous,   Status:  Canceled         03/17/24 0627 03/17/24 0628  Activity & Removal of Mancia Catheter, Per Obstetrician  Continuous,   Status:  Canceled         03/17/24 0627 03/17/24 0628  Notify Anesthesiologist for Any Questions / Problems  Continuous,   Status:  Canceled         03/17/24 0627 03/17/24 0628  Notify Anesthesia for Temp Over 101.4F  Continuous,   Status:  Canceled        Comments: May discharge from PACU with temperature at or above 95 degrees.    03/17/24 0627 03/17/24 0628  Ambu Bag at Bedside  Continuous         03/17/24 0627 03/17/24 0627  diphenhydrAMINE (BENADRYL) capsule 25 mg  Every 4 Hours PRN,   Status:  Discontinued        Placed in \"Or\" Linked Group    03/17/24 0627 03/17/24 0627  diphenhydrAMINE (BENADRYL) injection 25 mg  Once As Needed        Placed in \"Or\" Linked Group    03/17/24 0627 03/17/24 0627  diphenhydrAMINE (BENADRYL) injection 25 mg  Every 4 Hours PRN,   Status:  Discontinued        Placed in \"Or\" Linked Group    03/17/24 0627 03/17/24 0627  oxytocin (PITOCIN) 30 units in 0.9% sodium chloride 500 mL (premix)  Once,   Status:  Discontinued        Placed in \"Followed by\" Linked Group    " 03/17/24 0627 03/17/24 0627  methylergonovine (METHERGINE) injection 200 mcg  Once As Needed,   Status:  Discontinued         03/17/24 0627 03/17/24 0627  carboprost (HEMABATE) injection 250 mcg  As Needed,   Status:  Discontinued         03/17/24 0627 03/17/24 0627  miSOPROStol (CYTOTEC) tablet 800 mcg  As Needed,   Status:  Discontinued         03/17/24 0627    03/17/24 0602  Tissue Pathology Exam  Once         03/17/24 0602    03/17/24 0546  Tissue Pathology Exam  Once         03/17/24 0545    03/17/24 0500  ABO RH Specimen Verification  STAT         03/17/24 0435    03/17/24 0445  lactated ringers bolus 1,000 mL  Once         03/17/24 0357 03/17/24 0445  lactated ringers infusion  Continuous,   Status:  Discontinued         03/17/24 0357 03/17/24 0445  Sod Citrate-Citric Acid (BICITRA) oral solution 30 mL  Once         03/17/24 0357    03/17/24 0445  acetaminophen (TYLENOL) tablet 1,000 mg  Once         03/17/24 0357    03/17/24 0445  ceFAZolin 2000 mg IVPB in 100 mL NS (MBP)  Once         03/17/24 0357    03/17/24 0411  POC Amnisure  Once         03/17/24 0410    03/17/24 0400  Vital Signs q 4 while awake  Every 4 Hours,   Status:  Canceled      Comments: While the patient is awake.    03/17/24 0357 03/17/24 0357  NPO Diet NPO Type: Ice Chips  Diet Effective Now,   Status:  Canceled         03/17/24 0357 03/17/24 0356  Admit To Obstetrics Inpatient  Once         03/17/24 0357 03/17/24 0356  Code Status and Medical Interventions:  Continuous,   Status:  Canceled         03/17/24 0357 03/17/24 0356  Obtain Informed Consent  Once,   Status:  Canceled         03/17/24 0357 03/17/24 0356  Vital Signs Per Hospital Policy  Per Hospital Policy,   Status:  Canceled         03/17/24 0357 03/17/24 0356  Continuous Fetal Monitoring With NST on Admission and Prior to Initiation of Oxytocin.  Per Order Details,   Status:  Canceled        Comments: Continuous Fetal Monitoring With NST on  "Admission    03/17/24 0357 03/17/24 0356  External Uterine Contraction Monitoring  Per Hospital Policy,   Status:  Canceled         03/17/24 0357    03/17/24 0356  Notify Provider (Specified)  Until Discontinued,   Status:  Canceled         03/17/24 0357    03/17/24 0356  Notify Provider of Tachysystole (Per Hospital Algorithm)  Until Discontinued,   Status:  Canceled         03/17/24 0357 03/17/24 0356  Notify Provider if Membranes Ruptured, Bleeding Greater Than 1 Pad Per Hour, Fetal Heart Tone Abnormality or Severe Pain  Until Discontinued,   Status:  Canceled         03/17/24 0357    03/17/24 0356  Insert Indwelling Urinary Catheter  Once,   Status:  Canceled        Placed in \"And\" Linked Group    03/17/24 0357 03/17/24 0356  Assess Need for Indwelling Urinary Catheter - Follow Removal Protocol  Continuous,   Status:  Canceled        Comments: Indwelling Urinary Catheter Removal Criteria  Discontinue Indwelling Urinary Catheter Unless One of the Following is Present:  Urinary Retention or Obstruction  Chronic Urinary Catheter Use  End of Life  Critical Illness with Strict I/O   Tract or Abdominal Surgery  Stage 3/4 Sacral / Perineal Wound  Required Activity Restriction: Trauma  Required Activity Restriction: Spine Surgery  If Patient is Being Followed by Urology Contact Them PRIOR to Removal  Do Not Remove Indwelling Urinary Catheter Order is Present with a CLINICAL REASON to Maintain the Catheter. Provider is Required to Include a Clinical Reason to Maintain a Urinary Catheter    Patient Admitted With Indwelling Urinary Catheter (Not Placed at [facility])  Assess for Continued Need & Document Medical Necessity  If Infection is Suspected, Contact the Provider       Placed in \"And\" Linked Group    03/17/24 0357    03/17/24 0356  Urinary Catheter Care  Every Shift,   Status:  Canceled      Placed in \"And\" Linked Group    03/17/24 0357    03/17/24 0356  Abdominal Prep with Clippers  Once,   Status: "  Canceled         03/17/24 0357 03/17/24 0356  Chlorhexadine Skin Prep Unless Otherwise Indicated  Once,   Status:  Canceled         03/17/24 0357 03/17/24 0356  SCD (sequential compression devices)  Once,   Status:  Canceled         03/17/24 0357 03/17/24 0356  POC Glucose Once  Once,   Status:  Canceled         03/17/24 0357 03/17/24 0356  Document Gatorade Consumption Prior to Admission (Yes or No)  Once,   Status:  Canceled         03/17/24 0357 03/17/24 0356  Inpatient Consult to Anesthesiology  Once,   Status:  Canceled        Specialty:  Anesthesiology  Provider:  (Not yet assigned)    03/17/24 0357 03/17/24 0356  Type & Screen  Once         03/17/24 0357 03/17/24 0356  CBC (No Diff)  STAT         03/17/24 0357 03/17/24 0356  T Pallidum Antibody w/ reflex RPR (Syphilis)  Once         03/17/24 0357 03/17/24 0356  Insert Peripheral IV  Once,   Status:  Canceled         03/17/24 0357 03/17/24 0356  Saline Lock & Maintain IV Access  Continuous,   Status:  Canceled         03/17/24 0357 03/17/24 0355  sodium chloride 0.9 % flush 10 mL  As Needed,   Status:  Discontinued         03/17/24 0357 03/17/24 0355  sodium chloride 0.9 % infusion 40 mL  As Needed,   Status:  Discontinued         03/17/24 0357 03/17/24 0355  lidocaine PF 1% (XYLOCAINE) injection 0.5 mL  Once As Needed,   Status:  Discontinued         03/17/24 0357    Unscheduled  IV Site Care  As Needed       03/17/24 0627    Unscheduled  Blood Gas, Arterial -With Co-Ox Panel: Yes  As Needed       03/17/24 0627    Unscheduled  Up with Assistance  As Needed       03/17/24 0911    Unscheduled  Bladder Scan if Patient Unable to Void 4-6 Hours After Catheter Removal  As Needed         03/17/24 0911    Unscheduled  Straight Cath Every 4-6 Hours As Needed If Patient is Unable to Void After 4-6 Hours, Bladder Scan Volume is Greater Than 500mL & Patient Has Symptoms of Bladder Discomfort / Distention  As Needed       03/17/24  09    Unscheduled  Schedule / Prompt Voiding For Patients With Urinary Incontinence  As Needed       24 09    Unscheduled  Wound Care  As Needed      Comments: Postop day 1. Remove dressing and leave incision open to air.    24 09    Unscheduled  KPad  As Needed      Comments: PRN pain    24    Unscheduled  Apply Ice Pack to Perineum  As Needed      Comments: For 20 min q 2hrs    24    Unscheduled  Apply Waffle Cushion  As Needed      Comments: PRN perineal discomfort    2411    Unscheduled  Chewing Gum  As Needed       24    Unscheduled  Warm compress  As Needed       24    Unscheduled  Apply ace wrap, tight bra, or binder  As Needed       24    Unscheduled  Apply ice packs  As Needed       24    --  omeprazole (priLOSEC) 20 MG capsule  Daily         24    --  Prenatal Vit-Fe Fumarate-FA (Prenatal 27-1) 27-1 MG tablet tablet  Daily         24                     Operative/Procedure Notes (last 72 hours)        Erin Quintanlila MD at 24 0532           Section With Bilateral Salpingectomy  Procedure Note    Jaja Sutton    3/17/2024         Pre-operative Diagnosis:   1. IUP at 34w5d     2. PPROM     3. Previous CS, desires RCS       4. Desires permanent sterilization  5.  Advanced maternal age    Post-operative Diagnosis: same    Findings: Normal maternal anatomy    Estimated Blood Loss: 500.  QBL pending           Drains: Mancia                 Specimens: bilateral fallopian tubes              Complications:  None; patient tolerated the procedure well.           Disposition: PACU - hemodynamically stable.           Condition: stable    Surgeon: Erin Quintanilla MD     Assistants: Tech    Anesthesia: Spinal anesthesia    ASA Class: 2    Procedure Details   The patient was seen in the Holding Room. The risks, benefits, complications, treatment options, and expected outcomes were discussed with  the patient.  The patient concurred with the proposed plan, giving informed consent.  The site of surgery was properly noted. The patient was taken to the Operating Room, identified as Jaja Sutton and the procedure verified as  Delivery. A Time Out was held and the above information confirmed.    After induction of anesthesia, the patient was draped and prepped in the usual sterile manner. A Pfannenstiel incision was made and carried down through the subcutaneous tissue to the fascia. A fascial incision was made and extended transversely. The fascia was  from the underlying rectus tissue superiorly and inferiorly. The peritoneum was identified and entered. The peritoneal incision was extended longitudinally.  A low transverse uterine incision was made. Delivered from the vertex presentation was a male infant with birth weight 2240 g (4 lb 15 oz)  with apgars scores of         APGARS  One minute Five minutes Ten minutes Fifteen minutes Twenty minutes   Skin color: 0   1             Heart rate: 1   2             Grimace: 2   2              Muscle tone: 1   1              Breathin   1              Totals: 5   7              . After the umbilical cord was clamped and cut, cord blood was obtained for evaluation. The placenta was removed intact and appeared normal. The uterine outline, tubes and ovaries appeared normal. The uterine incision was closed with running locked sutures of 1 chromic.  There was bleeding from the right uterine artery.  This was ligated with an [place] suture of 1 chromic superiorly and inferiorly to the hysterotomy.  Hemostasis was observed.  Shanon powder was placed to assure continued excellent hemostasis      The right fallopian tube was elevated with a [place] clamp.  The Enseal was used to sequentially coagulate and cut the parametrium from the fimbria to the cornua, where the tube was then transected and handed off to pathology.  The same procedure was repeated on the  left fallopian tube which was removed and sent to pathology.  Hemostasis along the surgical planes was noted.  The fascia was then reapproximated with running sutures of 0 PDS. The subcutaneous tissue was reapproximated with 3-0 plain. The skin was reapproximated with insorb subcuticular staples and reinforced with skin glue.      Instrument, sponge, and needle counts were correct prior the abdominal closure and at the conclusion of the case.         Erin Quintanilla MD  10:19 EDT  3/17/2024         Electronically signed by Erin Quintanilla MD at 03/17/24 1019       Physician Progress Notes (last 72 hours)  Notes from 03/16/24 0717 through 03/19/24 0717   No notes of this type exist for this encounter.

## 2024-03-20 RX ORDER — ACETAMINOPHEN 325 MG/1
650 TABLET ORAL EVERY 6 HOURS
Status: DISCONTINUED | OUTPATIENT
Start: 2024-03-20 | End: 2024-03-21 | Stop reason: HOSPADM

## 2024-03-20 RX ADMIN — PANTOPRAZOLE SODIUM 40 MG: 40 TABLET, DELAYED RELEASE ORAL at 06:19

## 2024-03-20 RX ADMIN — POLYETHYLENE GLYCOL 3350 17 G: 17 POWDER, FOR SOLUTION ORAL at 07:49

## 2024-03-20 RX ADMIN — ACETAMINOPHEN 650 MG: 325 TABLET ORAL at 12:30

## 2024-03-20 RX ADMIN — IBUPROFEN 600 MG: 600 TABLET, FILM COATED ORAL at 15:58

## 2024-03-20 RX ADMIN — FERROUS SULFATE TAB 325 MG (65 MG ELEMENTAL FE) 325 MG: 325 (65 FE) TAB at 07:49

## 2024-03-20 RX ADMIN — IBUPROFEN 600 MG: 600 TABLET, FILM COATED ORAL at 00:33

## 2024-03-20 RX ADMIN — IBUPROFEN 600 MG: 600 TABLET, FILM COATED ORAL at 22:59

## 2024-03-20 RX ADMIN — IBUPROFEN 600 MG: 600 TABLET, FILM COATED ORAL at 07:49

## 2024-03-20 RX ADMIN — PRENATAL VITAMINS-IRON FUMARATE 27 MG IRON-FOLIC ACID 0.8 MG TABLET 1 TABLET: at 07:49

## 2024-03-20 RX ADMIN — ACETAMINOPHEN 650 MG: 325 TABLET ORAL at 18:11

## 2024-03-20 RX ADMIN — DOCUSATE SODIUM 100 MG: 100 CAPSULE, LIQUID FILLED ORAL at 22:59

## 2024-03-20 RX ADMIN — ACETAMINOPHEN 650 MG: 325 TABLET ORAL at 06:19

## 2024-03-20 NOTE — PROGRESS NOTES
3/20/2024    Name:Jaja Sutton    MR#:[id number]     PROGRESS NOTE:  Post-Op 3 S/P        Subjective   38 y.o. yo Female  s/p CS at 34w5d doing well. Pain well controlled, lochia appropriate, tolerating diet.        premature rupture of membranes (PPROM) with onset of labor within 24 hours of rupture in third trimester, antepartum    Status post repeat low transverse  section        Objective    Vitals  Temp:  Temp:  [98.4 °F (36.9 °C)-99 °F (37.2 °C)] 99 °F (37.2 °C)  Temp src: Oral  BP:  BP: (108-135)/(56-78) 135/76  Pulse:  Heart Rate:  [74-89] 89  RR:   Resp:  [16-18] 18    General Awake, alert, no distress  Abdomen Soft, non-distended, fundus firm, below umbilicus, appropriately tender  Incision  Intact, no erythema or exudate  Extremities Calves NT bilaterally     No intake/output data recorded.    LABS:   Lab Results   Component Value Date    WBC 11.13 (H) 2024    HGB 9.2 (L) 2024    HCT 27.6 (L) 2024    MCV 86.8 2024     2024       Infant: male       Assessment   1.  POD 3 from repeat  Section w/ BS, doing well  2. Blood loss anemia: asymptomatic.  On po iron    Plan:   Routine cares  Plan d/c home tomorrow      Erin Quintanilla MD  3/20/2024 09:18 EDT

## 2024-03-20 NOTE — PROGRESS NOTES
[facility]  Obstetric Progress Note    Chief Complaint: POD #2    Annie Wilkinson doing well. Pain is well controlled. Denies incisional complaints. Reports scant lochia without passage of major clots. Ambulating. Tolerating PO intake. Voiding without difficulty or dysuria.  Has been visiting male infant in NICU throughout the day. Would like to stay inpatient to be close to baby.   No further complaints at this time.      Objective     Vital Signs Range for the last 24 hours  Temp:  [98.4 °F (36.9 °C)-99.3 °F (37.4 °C)] 98.4 °F (36.9 °C)   BP: (109-117)/(63-78) 114/78   Heart Rate:  [81-92] 81   Resp:  [16-18] 16                   Intake/Output last 24 hours:    No intake or output data in the 24 hours ending 24 2154    Intake/Output this shift:    No intake/output data recorded.    Physical Exam:  General: A&Ox3. No acute distress.    HEENT Normocephalic, atraumatic    Heart RRR   Lungs CTAB, unlabored breathing   Abdomen Soft, non tender, negative for guarding and rebound   Extremities Exam of extremities: peripheral pulses normal, no pedal edema, no clubbing or cyanosis   Fundus Firm, midline, below umbilicus   Incision Clean, dry, intact       Laboratory Results:  Lab Results   Component Value Date    WBC 11.13 (H) 2024    HGB 9.2 (L) 2024    HCT 27.6 (L) 2024    MCV 86.8 2024     2024         Assessment  POD# 2 s/p repeat  delivery  Postpartum anemia    Plan  Routine postoperative care.  VSS.  Incision healing well.  Serial lochia, fundal height checks appropriate. Continue serially.   PP Hgb 9.2. start PO ferrous sulfate  Pain controlled with current regimen-continue.  Encouraged ambulation/IS  Male infant in NICU      Dispo  Plan for discharge home in 1-2 days pending patient and infant status.        This note has been electronically signed.    Gissell Nguyen DO  21:54 EDT  2024

## 2024-03-21 VITALS
RESPIRATION RATE: 18 BRPM | OXYGEN SATURATION: 98 % | DIASTOLIC BLOOD PRESSURE: 67 MMHG | BODY MASS INDEX: 33.99 KG/M2 | HEART RATE: 89 BPM | SYSTOLIC BLOOD PRESSURE: 139 MMHG | TEMPERATURE: 98.3 F | HEIGHT: 61 IN | WEIGHT: 180 LBS

## 2024-03-21 RX ORDER — PSEUDOEPHEDRINE HCL 30 MG
100 TABLET ORAL 2 TIMES DAILY PRN
Qty: 60 CAPSULE | Refills: 1 | Status: SHIPPED | OUTPATIENT
Start: 2024-03-21

## 2024-03-21 RX ORDER — OXYCODONE HYDROCHLORIDE 5 MG/1
5 TABLET ORAL EVERY 12 HOURS PRN
Qty: 3 TABLET | Refills: 0 | Status: SHIPPED | OUTPATIENT
Start: 2024-03-21 | End: 2024-03-23

## 2024-03-21 RX ORDER — IBUPROFEN 600 MG/1
600 TABLET ORAL EVERY 6 HOURS
Qty: 30 TABLET | Refills: 0 | Status: SHIPPED | OUTPATIENT
Start: 2024-03-21

## 2024-03-21 RX ORDER — FERROUS SULFATE 325(65) MG
325 TABLET ORAL
Qty: 30 TABLET | Refills: 0 | Status: SHIPPED | OUTPATIENT
Start: 2024-03-21

## 2024-03-21 RX ADMIN — IBUPROFEN 600 MG: 600 TABLET, FILM COATED ORAL at 04:51

## 2024-03-21 RX ADMIN — FERROUS SULFATE TAB 325 MG (65 MG ELEMENTAL FE) 325 MG: 325 (65 FE) TAB at 10:40

## 2024-03-21 RX ADMIN — PANTOPRAZOLE SODIUM 40 MG: 40 TABLET, DELAYED RELEASE ORAL at 06:56

## 2024-03-21 RX ADMIN — ACETAMINOPHEN 650 MG: 325 TABLET ORAL at 06:56

## 2024-03-21 RX ADMIN — IBUPROFEN 600 MG: 600 TABLET, FILM COATED ORAL at 10:40

## 2024-03-21 RX ADMIN — PRENATAL VITAMINS-IRON FUMARATE 27 MG IRON-FOLIC ACID 0.8 MG TABLET 1 TABLET: at 10:40

## 2024-03-21 RX ADMIN — ACETAMINOPHEN 650 MG: 325 TABLET ORAL at 00:50

## 2024-03-21 RX ADMIN — POLYETHYLENE GLYCOL 3350 17 G: 17 POWDER, FOR SOLUTION ORAL at 10:40

## 2024-03-21 NOTE — CASE MANAGEMENT/SOCIAL WORK
Continued Stay Note   [place]     Patient Name: Jaja Sutton  MRN: [id number]  Today's Date: 3/21/2024    Admit Date: 3/17/2024    Plan: [facility]   Discharge Plan       Row Name 03/21/24 1047       Plan    Plan [facility]    Plan Comments MSW contacted by RN regarding [facility]. MSW met with Pt at bedside and discussed [facility] application. MSW informed Pt that [facility] should have a room open either today or tomorrow. Pt very appreciative. MSW available.    Final Discharge Disposition Code 01 - home or self-care                   Discharge Codes    No documentation.                 Expected Discharge Date and Time       Expected Discharge Date Expected Discharge Time    Mar 21, 2024               VINNY Zuniga

## 2024-03-21 NOTE — PROGRESS NOTES
3/21/2024    Name:Jaja Sutton    MR#:[id number]     PROGRESS NOTE:  Post-Op 4 S/P        Subjective   38 y.o. yo Female  s/p CS at 34w5d doing well. Pain well controlled, lochia appropriate, tolerating diet.        premature rupture of membranes (PPROM) with onset of labor within 24 hours of rupture in third trimester, antepartum    Status post repeat low transverse  section        Objective    Vitals  Temp:  Temp:  [97.9 °F (36.6 °C)-99.3 °F (37.4 °C)] 98.3 °F (36.8 °C)  Temp src: Oral  BP:  BP: (116-139)/(67-77) 139/67  Pulse:  Heart Rate:  [74-89] 89  RR:   Resp:  [16-18] 18    General Awake, alert, no distress  Abdomen Soft, non-distended, fundus firm, below umbilicus, appropriately tender  Incision  Intact, no erythema or exudate  Extremities Calves NT bilaterally     No intake/output data recorded.    LABS:   Lab Results   Component Value Date    WBC 11.13 (H) 2024    HGB 9.2 (L) 2024    HCT 27.6 (L) 2024    MCV 86.8 2024     2024       Infant: male       Assessment   1.  POD 4 from repeat  Section w/ BS  2. Blood loss anemia: asymptomatic. On po iron     Plan:    D/C home  D/C instructions given, precautions reviewed.  2w incision check      Erin Quintanilla MD  3/21/2024 08:50 EDT

## 2024-03-21 NOTE — DISCHARGE SUMMARY
[facility]   Discharge Summary      Patient: Jaja Sutton      MR#:[id number]  Admission  Diagnosis:   Problems Addressed this Visit          Gravid and     Status post repeat low transverse  section - Primary    Relevant Medications    oxyCODONE (ROXICODONE) 5 MG immediate release tablet     Diagnoses         Codes Comments    Status post repeat low transverse  section    -  Primary ICD-10-CM: Z98.891  ICD-9-CM: V45.89             Discharge Diagnosis:   1. Status post repeat low transverse  section        Date of Admission: 3/17/2024  Date of Discharge:  3/21/2024    Procedures:  , Low transverse   3/17/2024    5:37 AM      Service:  Obstetrics    Hospital Course:  Patient underwent  section and remained in the hospital for 4 days.  During that time she remained afebrile and hemodynamically stable.  On the day of discharge, she was eating, ambulating and voiding without difficulty.      Labs  Lab Results   Component Value Date    WBC 11.13 (H) 2024    HGB 9.2 (L) 2024    HCT 27.6 (L) 2024    MCV 86.8 2024     2024     Results from last 7 days   Lab Units 24  0446 24  0414   ABO TYPING  A A   RH TYPING  Positive Positive   ANTIBODY SCREEN   --  Negative       Discharge Medications     Discharge Medications        New Medications        Instructions Start Date   docusate sodium 100 MG capsule   100 mg, Oral, 2 Times Daily PRN      ferrous sulfate 325 (65 FE) MG tablet   325 mg, Oral, Daily With Breakfast      ibuprofen 600 MG tablet  Commonly known as: ADVIL,MOTRIN   600 mg, Oral, Every 6 Hours      oxyCODONE 5 MG immediate release tablet  Commonly known as: ROXICODONE   5 mg, Oral, Every 12 Hours PRN             Continue These Medications        Instructions Start Date   omeprazole 20 MG capsule  Commonly known as: priLOSEC   20 mg, Oral, Daily      Prenatal 27-1 27-1 MG tablet tablet   Oral, Daily                Discharge Disposition:  To Home    Discharge Condition:  Stable    Discharge Diet: regular    Activity at Discharge: pelvic rest    Follow-up Appointments  2 weeks    Erin Quintanilla MD  03/21/24  08:54 EDT

## 2024-03-22 NOTE — PAYOR COMM NOTE
"Evan Sutton (38 y.o. Female)     [id number]  Delivery information:  C Section 3/17/24 @ 05:37  Wt 2240 gms  Male  Apgars 5/7    Discharged 3/21/24.    Needs approval.      From: Sowmya Jewell LPN, Utilization Review  Phone #[phone]  Fax #[phone]        Date of Birth   1985    Social Security Number       Address   [address] [place] [address]    Home Phone   [phone]    MRN   [id number]       Church   None    Marital Status   Single                            Admission Date   3/17/24    Admission Type   Elective    Admitting Provider   Erin Quintanilla MD    Attending Provider       Department, Room/Bed   [facility] MOTHER BABY 4B, N433/1       Discharge Date   3/21/2024    Discharge Disposition   Home or Self Care    Discharge Destination                                 Attending Provider: (none)   Allergies: No Known Allergies    Isolation: None   Infection: None   Code Status: Prior    Ht: 154.9 cm (61\")   Wt: 81.6 kg (180 lb)    Admission Cmt: None   Principal Problem:  premature rupture of membranes (PPROM) with onset of labor within 24 hours of rupture in third trimester, antepartum [O42.013]                   Active Insurance as of 3/17/2024       Primary Coverage       Payor Plan Insurance Group Employer/Plan Group    [employer] [place] [employer] [place]ICAID        Payor Plan Address Payor Plan Phone Number Payor Plan Fax Number Effective Dates    [address] [address] [phone]  2024 - None Entered    [facility] [id number]         Subscriber Name Subscriber Birth Date Member ID       SALINAEVAN KILPATRICK 1985 [id number]                     Emergency Contacts        (Rel.) Home Phone Work Phone Mobile Phone    DANGELO DAY (Mother) -- -- [phone]              Insurance Information                  [facility] MEDICAID Phone: [phone]    Subscriber: Evan Sutton Subscriber#: [id number]    Group#: -- Precert#: " --             History & Physical        High, Caesar DECLID MD at 24 0349          Jaja Sutton  1985  [id number]  [id number]    CC: leaking and contractions  HPI:  Patient is 38 y.o. female   currently at 34w5d presents with c/o SROM at~0150 with nearly immed onset contractions.  Pt denies vag bleeding.  Good FM.  PNC comp by prev  and adv mat age.     PMH:  Current meds: PNV, prilosec  Illnesses: none  Surgeries: , D and C X 2, oral surg  Allergies: NKDA    Past OB History:       OB History    Para Term  AB Living   4 1 1 0 2 1   SAB IAB Ectopic Molar Multiple Live Births   2 0 0 0 0 0      # Outcome Date GA Lbr [place]/2nd Weight Sex Type Anes PTL Lv   4 Current            3 Term            2 SAB            1 SAB               Obstetric Comments   SAB 2009   SAB 2012   10/02/2014  41 weeks male 8-11 oz c/s            SH: tob neg , EtOH newg, drugs neg    General ROS: contractions, leaking, edema.   All other systems reviewed and are negative.      Physical Examination: General appearance - alert, well appearing, and in no distress  Vital signs - There were no vitals taken for this visit.  HEENT: normocephalic, atraumatic,oropharynx clear, appearance of ears and nose normal  Neck - supple, no significant adenopathy, no thyromegaly  Lymphatics - no palpable lymphadenopathy in the neck or groin, no hepatosplenomegaly  Chest - clear to auscultation, no wheezes, rales or rhonchi, respiratory effort non-labored  Heart - normal rate, regular rhythm, no murmurs, rubs, clicks or gallops, no JVD, trace lower extremity edema  Abdomen - soft, nontender, nondistended, no masses, no hepatosplenomegaly  no rebound tenderness noted, bowel sounds normal  Vaginal Exam: 2/80%/-2, grossly ruptured, amnisure pos ,external genitalia normal  Extremities - trace pedal edema noted, no calf tend  Skin -warm and dry, normal coloration and turgor, no rashes, no suspicious skin lesions noted      Fetal  "monitoring: indication contractions , onset 0314 , offset 0350 , baseline 135 , mod BTB variability , multiple accels (15 X 15), no decels, q2-4\" contractions, interpretation reactive NST    Radiology     Assessment 1)IUP 34 5/7 weeks   2)PPROM with onset labor    3)prev    4)desires steril- after discussion of types and risks and benefits of each,    Pt desires bilat salpingectomy    Plan 1)admit   2)repeat  with bilat salpingectomy    Caesar Armenta MD  3/17/2024  03:50 EDT                                                                       Electronically signed by Caesar Armenta MD at 24 0354       H&P signed by [employer], [place] at 24 0714         [Media Unavailable] Scan on 3/17/2024 0713 by [employer], [place]: Prenatal Record          Electronically signed by [employer], [place] at 24 0714          Operative/Procedure Notes (last 7 days)        Erin Quintanilla MD at 24 0532           Section With Bilateral Salpingectomy  Procedure Note    Jaja Sutton    3/17/2024         Pre-operative Diagnosis:   1. IUP at 34w5d     2. PPROM     3. Previous CS, desires RCS       4. Desires permanent sterilization  5.  Advanced maternal age    Post-operative Diagnosis: same    Findings: Normal maternal anatomy    Estimated Blood Loss: 500.  QBL pending           Drains: Mancia                 Specimens: bilateral fallopian tubes              Complications:  None; patient tolerated the procedure well.           Disposition: PACU - hemodynamically stable.           Condition: stable    Surgeon: Erin Quintanilla MD     Assistants: Tech    Anesthesia: Spinal anesthesia    ASA Class: 2    Procedure Details   The patient was seen in the Holding Room. The risks, benefits, complications, treatment options, and expected outcomes were discussed with the patient.  The patient concurred with the proposed plan, giving informed consent.  The site of surgery was properly noted. The " patient was taken to the Operating Room, identified as Jaja Sutton and the procedure verified as  Delivery. A Time Out was held and the above information confirmed.    After induction of anesthesia, the patient was draped and prepped in the usual sterile manner. A Pfannenstiel incision was made and carried down through the subcutaneous tissue to the fascia. A fascial incision was made and extended transversely. The fascia was  from the underlying rectus tissue superiorly and inferiorly. The peritoneum was identified and entered. The peritoneal incision was extended longitudinally.  A low transverse uterine incision was made. Delivered from the vertex presentation was a male infant with birth weight 2240 g (4 lb 15 oz)  with apgars scores of         APGARS  One minute Five minutes Ten minutes Fifteen minutes Twenty minutes   Skin color: 0   1             Heart rate: 1   2             Grimace: 2   2              Muscle tone: 1   1              Breathin   1              Totals: 5   7              . After the umbilical cord was clamped and cut, cord blood was obtained for evaluation. The placenta was removed intact and appeared normal. The uterine outline, tubes and ovaries appeared normal. The uterine incision was closed with running locked sutures of 1 chromic.  There was bleeding from the right uterine artery.  This was ligated with an [place] suture of 1 chromic superiorly and inferiorly to the hysterotomy.  Hemostasis was observed.  Shanon powder was placed to assure continued excellent hemostasis      The right fallopian tube was elevated with a [place] clamp.  The Enseal was used to sequentially coagulate and cut the parametrium from the fimbria to the cornua, where the tube was then transected and handed off to pathology.  The same procedure was repeated on the left fallopian tube which was removed and sent to pathology.  Hemostasis along the surgical planes was noted.  The fascia was then  reapproximated with running sutures of 0 PDS. The subcutaneous tissue was reapproximated with 3-0 plain. The skin was reapproximated with insorb subcuticular staples and reinforced with skin glue.      Instrument, sponge, and needle counts were correct prior the abdominal closure and at the conclusion of the case.         Erin Quintanilla MD  10:19 EDT  3/17/2024         Electronically signed by Erin Quintanilla MD at 24 1019          Discharge Summary        Erin Quintanilla MD at 24 0854          [facility]   Discharge Summary      Patient: Jaja Sutton      MR#:[id number]  Admission  Diagnosis:   Problems Addressed this Visit          Gravid and     Status post repeat low transverse  section - Primary    Relevant Medications    oxyCODONE (ROXICODONE) 5 MG immediate release tablet     Diagnoses         Codes Comments    Status post repeat low transverse  section    -  Primary ICD-10-CM: Z98.891  ICD-9-CM: V45.89             Discharge Diagnosis:   1. Status post repeat low transverse  section        Date of Admission: 3/17/2024  Date of Discharge:  3/21/2024    Procedures:  , Low transverse   3/17/2024    5:37 AM      Service:  Obstetrics    Hospital Course:  Patient underwent  section and remained in the hospital for 4 days.  During that time she remained afebrile and hemodynamically stable.  On the day of discharge, she was eating, ambulating and voiding without difficulty.      Labs  Lab Results   Component Value Date    WBC 11.13 (H) 2024    HGB 9.2 (L) 2024    HCT 27.6 (L) 2024    MCV 86.8 2024     2024     Results from last 7 days   Lab Units 24  0446 24  0414   ABO TYPING  A A   RH TYPING  Positive Positive   ANTIBODY SCREEN   --  Negative       Discharge Medications     Discharge Medications        New Medications        Instructions Start Date   docusate sodium 100 MG capsule   100 mg,  Oral, 2 Times Daily PRN      ferrous sulfate 325 (65 FE) MG tablet   325 mg, Oral, Daily With Breakfast      ibuprofen 600 MG tablet  Commonly known as: ADVIL,MOTRIN   600 mg, Oral, Every 6 Hours      oxyCODONE 5 MG immediate release tablet  Commonly known as: ROXICODONE   5 mg, Oral, Every 12 Hours PRN             Continue These Medications        Instructions Start Date   omeprazole 20 MG capsule  Commonly known as: priLOSEC   20 mg, Oral, Daily      Prenatal 27-1 27-1 MG tablet tablet   Oral, Daily               Discharge Disposition:  To Home    Discharge Condition:  Stable    Discharge Diet: regular    Activity at Discharge: pelvic rest    Follow-up Appointments  2 weeks    Erin Quintanilla MD  03/21/24  08:54 EDT             Electronically signed by Erin Quintanilla MD at 03/21/24 0854

## (undated) DEVICE — PATIENT RETURN ELECTRODE, SINGLE-USE, CONTACT QUALITY MONITORING, ADULT, WITH 9FT CORD, FOR PATIENTS WEIGING OVER 33LBS. (15KG): Brand: MEGADYNE

## (undated) DEVICE — SUT GUT CHRM 1 CTX 36IN 905H

## (undated) DEVICE — 4-PORT MANIFOLD: Brand: NEPTUNE 2

## (undated) DEVICE — COATED VICRYL  (POLYGLACTIN 910) SUTURE, VIOLET BRAIDED, STERILE, SYNTHETIC ABSORBABLE SUTURE: Brand: COATED VICRYL

## (undated) DEVICE — STPLR SKIN SUBCUTICULAR INSORB 2030

## (undated) DEVICE — SOL IRR NACL 0.9PCT BT 1000ML

## (undated) DEVICE — TRAP FLD MINIVAC MEGADYNE 100ML

## (undated) DEVICE — ADHS SKIN PREMIERPRO EXOFIN TOPICAL HI/VISC .5ML

## (undated) DEVICE — SUT PLAIN  3/0 CT1 27IN 842H

## (undated) DEVICE — TRY SPINE BLCK WHITACRE 25G 3X5IN

## (undated) DEVICE — SOL IRR H2O BTL 1000ML STRL

## (undated) DEVICE — SUT GUT PLN 0 STD TIE 54IN S104H

## (undated) DEVICE — PK C/SECT 10

## (undated) DEVICE — ENSEAL 20 CM SHAFT, LARGE JAW: Brand: ENSEAL X1

## (undated) DEVICE — TBG PENCL TELESCP MEGADYNE SMOKE EVAC 10FT

## (undated) DEVICE — GLV SURG SENSICARE W/ALOE PF LF 6.5 STRL

## (undated) DEVICE — MAT PREVALON MOBL TRANSFR AIR W/PAD REPROC 39X81IN